# Patient Record
Sex: MALE | Race: WHITE | NOT HISPANIC OR LATINO | Employment: UNEMPLOYED | URBAN - NONMETROPOLITAN AREA
[De-identification: names, ages, dates, MRNs, and addresses within clinical notes are randomized per-mention and may not be internally consistent; named-entity substitution may affect disease eponyms.]

---

## 2020-05-31 LAB — SARS-COV-2 RNA RESP QL NAA+PROBE: NOT DETECTED

## 2020-05-31 PROCEDURE — 87635 SARS-COV-2 COVID-19 AMP PRB: CPT | Performed by: INTERNAL MEDICINE

## 2020-06-03 ENCOUNTER — ANESTHESIA EVENT (OUTPATIENT)
Dept: GASTROENTEROLOGY | Facility: HOSPITAL | Age: 45
End: 2020-06-03

## 2020-06-03 ENCOUNTER — HOSPITAL ENCOUNTER (OUTPATIENT)
Facility: HOSPITAL | Age: 45
Setting detail: HOSPITAL OUTPATIENT SURGERY
Discharge: HOME | End: 2020-06-03
Attending: INTERNAL MEDICINE | Admitting: INTERNAL MEDICINE

## 2020-06-03 ENCOUNTER — ANESTHESIA (OUTPATIENT)
Dept: GASTROENTEROLOGY | Facility: HOSPITAL | Age: 45
End: 2020-06-03

## 2020-06-03 VITALS
RESPIRATION RATE: 20 BRPM | HEART RATE: 85 BPM | OXYGEN SATURATION: 96 % | HEIGHT: 71 IN | TEMPERATURE: 97.8 F | WEIGHT: 277 LBS | SYSTOLIC BLOOD PRESSURE: 103 MMHG | BODY MASS INDEX: 38.78 KG/M2 | DIASTOLIC BLOOD PRESSURE: 64 MMHG

## 2020-06-03 DIAGNOSIS — R10.12 LEFT UPPER QUADRANT PAIN: ICD-10-CM

## 2020-06-03 PROCEDURE — 87076 CULTURE ANAEROBE IDENT EACH: CPT | Performed by: INTERNAL MEDICINE

## 2020-06-03 PROCEDURE — 25010000002 PROPOFOL 10 MG/ML EMULSION: Performed by: NURSE ANESTHETIST, CERTIFIED REGISTERED

## 2020-06-03 PROCEDURE — 87071 CULTURE AEROBIC QUANT OTHER: CPT | Performed by: INTERNAL MEDICINE

## 2020-06-03 RX ORDER — ONDANSETRON 2 MG/ML
4 INJECTION INTRAMUSCULAR; INTRAVENOUS ONCE AS NEEDED
Status: DISCONTINUED | OUTPATIENT
Start: 2020-06-03 | End: 2020-06-03 | Stop reason: HOSPADM

## 2020-06-03 RX ORDER — LISINOPRIL 10 MG/1
10 TABLET ORAL DAILY
COMMUNITY

## 2020-06-03 RX ORDER — LIDOCAINE HYDROCHLORIDE 20 MG/ML
INJECTION, SOLUTION INTRAVENOUS AS NEEDED
Status: DISCONTINUED | OUTPATIENT
Start: 2020-06-03 | End: 2020-06-03 | Stop reason: SURG

## 2020-06-03 RX ORDER — HYDROCODONE BITARTRATE AND ACETAMINOPHEN 10; 325 MG/1; MG/1
1 TABLET ORAL 4 TIMES DAILY
COMMUNITY

## 2020-06-03 RX ORDER — TADALAFIL 5 MG/1
5 TABLET ORAL DAILY PRN
COMMUNITY

## 2020-06-03 RX ORDER — MELOXICAM 15 MG/1
15 TABLET ORAL DAILY
COMMUNITY

## 2020-06-03 RX ORDER — DEXTROSE AND SODIUM CHLORIDE 5; .45 G/100ML; G/100ML
30 INJECTION, SOLUTION INTRAVENOUS CONTINUOUS PRN
Status: DISCONTINUED | OUTPATIENT
Start: 2020-06-03 | End: 2020-06-03 | Stop reason: HOSPADM

## 2020-06-03 RX ORDER — MORPHINE SULFATE 15 MG/1
15 TABLET ORAL 2 TIMES DAILY
COMMUNITY
End: 2022-05-02 | Stop reason: SDUPTHER

## 2020-06-03 RX ORDER — TIZANIDINE 4 MG/1
4 TABLET ORAL 4 TIMES DAILY
COMMUNITY

## 2020-06-03 RX ORDER — PROPOFOL 10 MG/ML
VIAL (ML) INTRAVENOUS AS NEEDED
Status: DISCONTINUED | OUTPATIENT
Start: 2020-06-03 | End: 2020-06-03 | Stop reason: SURG

## 2020-06-03 RX ADMIN — PROPOFOL 20 MG: 10 INJECTION, EMULSION INTRAVENOUS at 16:16

## 2020-06-03 RX ADMIN — PROPOFOL 20 MG: 10 INJECTION, EMULSION INTRAVENOUS at 16:21

## 2020-06-03 RX ADMIN — PROPOFOL 20 MG: 10 INJECTION, EMULSION INTRAVENOUS at 16:07

## 2020-06-03 RX ADMIN — PROPOFOL 20 MG: 10 INJECTION, EMULSION INTRAVENOUS at 16:14

## 2020-06-03 RX ADMIN — PROPOFOL 20 MG: 10 INJECTION, EMULSION INTRAVENOUS at 16:22

## 2020-06-03 RX ADMIN — PROPOFOL 50 MG: 10 INJECTION, EMULSION INTRAVENOUS at 16:05

## 2020-06-03 RX ADMIN — DEXTROSE AND SODIUM CHLORIDE 30 ML/HR: 5; 450 INJECTION, SOLUTION INTRAVENOUS at 16:00

## 2020-06-03 RX ADMIN — PROPOFOL 100 MG: 10 INJECTION, EMULSION INTRAVENOUS at 16:03

## 2020-06-03 RX ADMIN — PROPOFOL 20 MG: 10 INJECTION, EMULSION INTRAVENOUS at 16:13

## 2020-06-03 RX ADMIN — LIDOCAINE HYDROCHLORIDE 60 MG: 20 INJECTION, SOLUTION INTRAVENOUS at 16:03

## 2020-06-03 RX ADMIN — PROPOFOL 20 MG: 10 INJECTION, EMULSION INTRAVENOUS at 16:08

## 2020-06-03 RX ADMIN — PROPOFOL 20 MG: 10 INJECTION, EMULSION INTRAVENOUS at 16:09

## 2020-06-03 RX ADMIN — PROPOFOL 50 MG: 10 INJECTION, EMULSION INTRAVENOUS at 16:04

## 2020-06-03 RX ADMIN — PROPOFOL 20 MG: 10 INJECTION, EMULSION INTRAVENOUS at 16:10

## 2020-06-03 RX ADMIN — PROPOFOL 20 MG: 10 INJECTION, EMULSION INTRAVENOUS at 16:12

## 2020-06-03 RX ADMIN — PROPOFOL 20 MG: 10 INJECTION, EMULSION INTRAVENOUS at 16:11

## 2020-06-03 RX ADMIN — PROPOFOL 20 MG: 10 INJECTION, EMULSION INTRAVENOUS at 16:18

## 2020-06-03 RX ADMIN — PROPOFOL 20 MG: 10 INJECTION, EMULSION INTRAVENOUS at 16:17

## 2020-06-03 RX ADMIN — PROPOFOL 20 MG: 10 INJECTION, EMULSION INTRAVENOUS at 16:15

## 2020-06-03 RX ADMIN — PROPOFOL 20 MG: 10 INJECTION, EMULSION INTRAVENOUS at 16:19

## 2020-06-03 RX ADMIN — PROPOFOL 20 MG: 10 INJECTION, EMULSION INTRAVENOUS at 16:20

## 2020-06-03 NOTE — ANESTHESIA PREPROCEDURE EVALUATION
Anesthesia Evaluation     Patient summary reviewed   NPO Solid Status: > 8 hours  NPO Liquid Status: > 2 hours           Airway   Mallampati: II  TM distance: >3 FB  Dental      Pulmonary - normal exam   (+) a smoker Current Smoked day of surgery,   Cardiovascular - negative cardio ROS and normal exam        Neuro/Psych  GI/Hepatic/Renal/Endo    (+) obesity, morbid obesity,      Musculoskeletal     (+) chronic pain,   Abdominal    Substance History      OB/GYN          Other                      Anesthesia Plan    ASA 3     MAC     intravenous induction     Anesthetic plan, all risks, benefits, and alternatives have been provided, discussed and informed consent has been obtained with: patient.

## 2020-06-03 NOTE — ANESTHESIA POSTPROCEDURE EVALUATION
Patient: Andres Murphy    Procedure Summary     Date:  06/03/20 Room / Location:  [facility] ENDOSCOPY 2 / [facility] ENDOSCOPY    Anesthesia Start:  1602 Anesthesia Stop:  1629    Procedures:       ESOPHAGOGASTRODUODENOSCOPY (N/A )      COLONOSCOPY (N/A ) Diagnosis:       Left upper quadrant pain      Gastritis      Esophagitis      Colon polyp      (Left upper quadrant pain [R10.12])    Surgeon:  Temo Andrade DO Provider:  Jamey Orozco CRNA    Anesthesia Type:  MAC ASA Status:  3          Anesthesia Type: MAC    Vitals  No vitals data found for the desired time range.          Post Anesthesia Care and Evaluation    Patient location during evaluation: PACU  Level of consciousness: awake  Pain score: 0  Pain management: adequate  Airway patency: patent  Anesthetic complications: No anesthetic complications  PONV Status: none  Cardiovascular status: acceptable and hemodynamically stable  Respiratory status: acceptable and spontaneous ventilation  Hydration status: acceptable

## 2020-06-03 NOTE — H&P
Ra Hedrick DO,[facility]  Gastroenterology  Hepatology  Endoscopy  Board Certified in Internal Medicine and gastroenterology  [address], [address]  [place]. [address]  - [phone]   F - [phone]     GASTROENTEROLOGY HISTORY AND PHYSICAL  NOTE   RA HEDRICK DO.         SUBJECTIVE:   6/3/2020    Name: Andres Murphy  DOD: 1975    Chief Complaint:       Subjective : Left upper quadrant pain, left-sided abdominal pain.  Multiple evaluations to include over 6 CT scans of done over the past 4 years.  Only one has shown diverticular disease.  Multiple medical treatments have been done without improvement.    Patient is 45 y.o. male presents with desire for elective colonoscopy for the questionable abnormal CT scan on one occasion.      ROS/HISTORY/ CURRENT MEDICATIONS/OBJECTIVE/VS/PE:   Review of Systems:  All systems unremarkable unless specified below.  Constitutional   HENT  Eyes   Respiratory    Cardiovascular  Gastrointestinal   Endocrine  Genitourinary    Musculoskeletal   Skin  Allergic/Immunologic    Neurological    Hematological  Psychiatric/Behavioral    History:     Past Medical History:   Diagnosis Date   • Encounter for routine adult health examination      Past Surgical History:   Procedure Laterality Date   • CARPAL TUNNEL RELEASE     • VASECTOMY       Family History   Problem Relation Age of Onset   • Cancer Other    • Colon cancer Other    • Depression Other    • Diabetes Other    • Osteoarthritis Other    • Stroke Other    • Thyroid disease Other      Social History     Tobacco Use   • Smoking status: Current Every Day Smoker   Substance Use Topics   • Alcohol use: No   • Drug use: Not on file     Prior to Admission medications    Medication Sig Start Date End Date Taking? Authorizing Provider   baclofen (LIORESAL) 20 MG tablet Take 20 mg by mouth 3 (Three) Times a Day.    Provider, MD Dylna   HYDROcodone-acetaminophen (NORCO) 7.5-325 MG per tablet Take 1  "tablet by mouth Every 6 (Six) Hours As Needed for Moderate Pain (4-6).    Provider, MD Dylan   Morphine (AVINza) 30 MG 24 hr capsule Take 30 mg by mouth Daily.    Provider, Dylan, MD     Allergies:  Patient has no known allergies.    I have reviewed the patients medical history, surgical history and family history in the available medical record system.     Current Medications:     No current facility-administered medications for this encounter.      Current Outpatient Medications   Medication Sig Dispense Refill   • baclofen (LIORESAL) 20 MG tablet Take 20 mg by mouth 3 (Three) Times a Day.     • HYDROcodone-acetaminophen (NORCO) 7.5-325 MG per tablet Take 1 tablet by mouth Every 6 (Six) Hours As Needed for Moderate Pain (4-6).     • Morphine (AVINza) 30 MG 24 hr capsule Take 30 mg by mouth Daily.         Objective     Physical Exam:   BP: ()/()   Arterial Line BP: ()/()   /64   Pulse 85   Temp 97.8 °F (36.6 °C)   Resp 20   Ht 180.3 cm (71\")   Wt 126 kg (277 lb)   SpO2 96%   BMI 38.63 kg/m²   Physical Exam:  General Appearance:    Alert, cooperative, in no acute distress   Head:    Normocephalic, without obvious abnormality, atraumatic   Eyes:            Lids and lashes normal, conjunctivae and sclerae normal, no   icterus, no pallor, corneas clear, PERRLA   Ears:    Ears appear intact with no abnormalities noted   Throat:   No oral lesions, no thrush, oral mucosa moist   Neck:   No adenopathy, supple, trachea midline, no thyromegaly, no     carotid bruit, no JVD   Back:     No kyphosis present, no scoliosis present, no skin lesions,       erythema or scars, no tenderness to percussion or                   palpation,   range of motion normal   Lungs:     Clear to auscultation,respirations regular, even and                   unlabored    Heart:    Regular rhythm and normal rate, normal S1 and S2, no            murmur, no gallop, no rub, no click   Breast Exam:    Deferred   Abdomen:     " Normal bowel sounds, no masses, no organomegaly, soft        non-tender, non-distended, no guarding, no rebound                 tenderness   Genitalia:    Deferred   Extremities:   Moves all extremities well, no edema, no cyanosis, no              redness   Pulses:   Pulses palpable and equal bilaterally   Skin:   No bleeding, bruising or rash   Lymph nodes:   No palpable adenopathy   Neurologic:   Cranial nerves 2 - 12 grossly intact, sensation intact, DTR        present and equal bilaterally      Results Review:     No results found for: WBC, HGB, HCT, PLT          No results found for: LIPASE  No results found for: INR  No results found for: CULTURE    Radiology Review:  Imaging Results (Last 72 Hours)     ** No results found for the last 72 hours. **           I reviewed the patient's new clinical results.  I reviewed the patient's new imaging results and agree with the interpretation.     ASSESSMENT/PLAN:   ASSESSMENT:  1.  Diverticulosis noted on one occasion  2.  Abdominal pain, not improving with medical treatments    PLAN:  1.  Colonoscopy for further evaluation    Risk and benefits associated with the procedure are reviewed with the patient.  The patient wished to proceed       Temo Andrade DO  06/03/20  13:16

## 2020-06-05 LAB
BACTERIA SPEC AEROBE CULT: ABNORMAL
LAB AP CASE REPORT: NORMAL
PATH REPORT.FINAL DX SPEC: NORMAL

## 2020-09-27 ENCOUNTER — HOSPITAL ENCOUNTER (INPATIENT)
Facility: HOSPITAL | Age: 45
LOS: 1 days | Discharge: HOME | End: 2020-09-27
Attending: FAMILY MEDICINE | Admitting: PSYCHIATRY & NEUROLOGY

## 2020-09-27 ENCOUNTER — APPOINTMENT (OUTPATIENT)
Dept: ULTRASOUND IMAGING | Facility: HOSPITAL | Age: 45
End: 2020-09-27

## 2020-09-27 VITALS
RESPIRATION RATE: 16 BRPM | WEIGHT: 282.6 LBS | DIASTOLIC BLOOD PRESSURE: 60 MMHG | BODY MASS INDEX: 39.56 KG/M2 | TEMPERATURE: 98.1 F | OXYGEN SATURATION: 95 % | HEIGHT: 71 IN | SYSTOLIC BLOOD PRESSURE: 139 MMHG | HEART RATE: 70 BPM

## 2020-09-27 DIAGNOSIS — G35 MULTIPLE SCLEROSIS EXACERBATION (HCC): Primary | ICD-10-CM

## 2020-09-27 PROBLEM — R20.2 PARESTHESIAS: Status: ACTIVE | Noted: 2020-09-27

## 2020-09-27 LAB
ALBUMIN SERPL-MCNC: 4.6 G/DL (ref 3.5–5.2)
ALBUMIN/GLOB SERPL: 2.3 G/DL
ALP SERPL-CCNC: 65 U/L (ref 39–117)
ALT SERPL W P-5'-P-CCNC: 13 U/L (ref 1–41)
ANION GAP SERPL CALCULATED.3IONS-SCNC: 13 MMOL/L (ref 5–15)
APTT PPP: 24.5 SECONDS (ref 24.1–35)
AST SERPL-CCNC: 15 U/L (ref 1–40)
BASOPHILS # BLD AUTO: 0.04 10*3/MM3 (ref 0–0.2)
BASOPHILS NFR BLD AUTO: 0.5 % (ref 0–1.5)
BILIRUB SERPL-MCNC: 0.2 MG/DL (ref 0–1.2)
BUN SERPL-MCNC: 15 MG/DL (ref 6–20)
BUN/CREAT SERPL: 17.6 (ref 7–25)
CALCIUM SPEC-SCNC: 9.3 MG/DL (ref 8.6–10.5)
CHLORIDE SERPL-SCNC: 105 MMOL/L (ref 98–107)
CO2 SERPL-SCNC: 23 MMOL/L (ref 22–29)
CREAT SERPL-MCNC: 0.85 MG/DL (ref 0.76–1.27)
DEPRECATED RDW RBC AUTO: 45.2 FL (ref 37–54)
EOSINOPHIL # BLD AUTO: 0.2 10*3/MM3 (ref 0–0.4)
EOSINOPHIL NFR BLD AUTO: 2.6 % (ref 0.3–6.2)
ERYTHROCYTE [DISTWIDTH] IN BLOOD BY AUTOMATED COUNT: 13.4 % (ref 12.3–15.4)
GFR SERPL CREATININE-BSD FRML MDRD: 97 ML/MIN/1.73
GLOBULIN UR ELPH-MCNC: 2 GM/DL
GLUCOSE SERPL-MCNC: 127 MG/DL (ref 65–99)
HCT VFR BLD AUTO: 36.3 % (ref 37.5–51)
HGB BLD-MCNC: 12.2 G/DL (ref 13–17.7)
IMM GRANULOCYTES # BLD AUTO: 0.05 10*3/MM3 (ref 0–0.05)
IMM GRANULOCYTES NFR BLD AUTO: 0.7 % (ref 0–0.5)
INR PPP: 1.1 (ref 0.91–1.09)
LYMPHOCYTES # BLD AUTO: 1.31 10*3/MM3 (ref 0.7–3.1)
LYMPHOCYTES NFR BLD AUTO: 17.1 % (ref 19.6–45.3)
MCH RBC QN AUTO: 31.2 PG (ref 26.6–33)
MCHC RBC AUTO-ENTMCNC: 33.6 G/DL (ref 31.5–35.7)
MCV RBC AUTO: 92.8 FL (ref 79–97)
MONOCYTES # BLD AUTO: 0.65 10*3/MM3 (ref 0.1–0.9)
MONOCYTES NFR BLD AUTO: 8.5 % (ref 5–12)
NEUTROPHILS NFR BLD AUTO: 5.4 10*3/MM3 (ref 1.7–7)
NEUTROPHILS NFR BLD AUTO: 70.6 % (ref 42.7–76)
NRBC BLD AUTO-RTO: 0 /100 WBC (ref 0–0.2)
PLATELET # BLD AUTO: 227 10*3/MM3 (ref 140–450)
PMV BLD AUTO: 9.4 FL (ref 6–12)
POTASSIUM SERPL-SCNC: 4.1 MMOL/L (ref 3.5–5.2)
PROT SERPL-MCNC: 6.6 G/DL (ref 6–8.5)
PROTHROMBIN TIME: 13.8 SECONDS (ref 11.9–14.6)
RBC # BLD AUTO: 3.91 10*6/MM3 (ref 4.14–5.8)
SARS-COV-2 RNA PNL SPEC NAA+PROBE: NOT DETECTED
SODIUM SERPL-SCNC: 141 MMOL/L (ref 136–145)
WBC # BLD AUTO: 7.65 10*3/MM3 (ref 3.4–10.8)

## 2020-09-27 PROCEDURE — 25010000002 METHYLPREDNISOLONE PER 125 MG: Performed by: FAMILY MEDICINE

## 2020-09-27 PROCEDURE — 99236 HOSP IP/OBS SAME DATE HI 85: CPT | Performed by: PSYCHIATRY & NEUROLOGY

## 2020-09-27 PROCEDURE — 80053 COMPREHEN METABOLIC PANEL: CPT | Performed by: FAMILY MEDICINE

## 2020-09-27 PROCEDURE — 93971 EXTREMITY STUDY: CPT

## 2020-09-27 PROCEDURE — 93971 EXTREMITY STUDY: CPT | Performed by: SURGERY

## 2020-09-27 PROCEDURE — G0378 HOSPITAL OBSERVATION PER HR: HCPCS

## 2020-09-27 PROCEDURE — 99283 EMERGENCY DEPT VISIT LOW MDM: CPT

## 2020-09-27 PROCEDURE — 85025 COMPLETE CBC W/AUTO DIFF WBC: CPT | Performed by: FAMILY MEDICINE

## 2020-09-27 PROCEDURE — 85610 PROTHROMBIN TIME: CPT | Performed by: FAMILY MEDICINE

## 2020-09-27 PROCEDURE — 85730 THROMBOPLASTIN TIME PARTIAL: CPT | Performed by: FAMILY MEDICINE

## 2020-09-27 PROCEDURE — 87635 SARS-COV-2 COVID-19 AMP PRB: CPT | Performed by: FAMILY MEDICINE

## 2020-09-27 RX ORDER — ACETAMINOPHEN 325 MG/1
650 TABLET ORAL EVERY 6 HOURS PRN
Status: DISCONTINUED | OUTPATIENT
Start: 2020-09-27 | End: 2020-09-28 | Stop reason: HOSPADM

## 2020-09-27 RX ORDER — HYDROXYZINE HYDROCHLORIDE 25 MG/1
25 TABLET, FILM COATED ORAL 2 TIMES DAILY
COMMUNITY

## 2020-09-27 RX ORDER — DIPHENHYDRAMINE HCL 25 MG
25 CAPSULE ORAL NIGHTLY PRN
COMMUNITY

## 2020-09-27 RX ORDER — CHOLECALCIFEROL (VITAMIN D3) 125 MCG
20 CAPSULE ORAL
Status: ON HOLD | COMMUNITY
End: 2020-09-27

## 2020-09-27 RX ORDER — PHENOL 1.4 %
20 AEROSOL, SPRAY (ML) MUCOUS MEMBRANE NIGHTLY PRN
COMMUNITY

## 2020-09-27 RX ORDER — PANTOPRAZOLE SODIUM 40 MG/1
40 TABLET, DELAYED RELEASE ORAL DAILY
COMMUNITY

## 2020-09-27 RX ORDER — METHYLPREDNISOLONE SODIUM SUCCINATE 125 MG/2ML
125 INJECTION, POWDER, LYOPHILIZED, FOR SOLUTION INTRAMUSCULAR; INTRAVENOUS ONCE
Status: COMPLETED | OUTPATIENT
Start: 2020-09-27 | End: 2020-09-27

## 2020-09-27 RX ADMIN — METHYLPREDNISOLONE SODIUM SUCCINATE 125 MG: 125 INJECTION, POWDER, FOR SOLUTION INTRAMUSCULAR; INTRAVENOUS at 15:41

## 2020-09-28 ENCOUNTER — TELEPHONE (OUTPATIENT)
Dept: NEUROLOGY | Facility: CLINIC | Age: 45
End: 2020-09-28

## 2020-09-28 NOTE — TELEPHONE ENCOUNTER
Hospital called today for a follow in 3 wks for this patient specifically with Jasvir, but I did not have any openings until 1/8/2021. I scheduled it then took a copy to Branden so she could find an earlier time because patient has MS and I did not think it should wait.

## 2020-10-07 ENCOUNTER — TELEPHONE (OUTPATIENT)
Dept: NEUROLOGY | Facility: CLINIC | Age: 45
End: 2020-10-07

## 2020-10-07 NOTE — TELEPHONE ENCOUNTER
"PT SEEN DOCTOR KAYCEE ON 9- ANS IS SCHEDULE FOR A HOS F/U WITH MARII FOSSES ON 10-, PER 'S NOTES \"He may be a candidate for Acthar and I discussed this with him.  I once again encouraged him to establish care in an MS clinic but he refuses to do this.  I have offered him a visit in our outpatient neurology clinic.  If he does this I would require that he would have an open mind and considering a disease modifying agent as we would have no desire to only treat him with acute agent such as Acthar.  I do not believe that he would require any further steroids as the risk of these would far outweigh any benefit given his level of osteopenia and avascular necrosis of the hip.  I think Ocrevus or Lemtrada would be at least 2 medications that I would consider in him and he still would likely require referral to an MS center for further clarification\"    PATIENT STATES [employer] DENIED THIS AND STATES [facility] DENIED. HE IS WONDERING WHAT THE NEXT STEP WOULD BE.    PLEASE ADVISE.     Andres Murphy ([facility]) [phone] ()       "

## 2020-10-08 NOTE — TELEPHONE ENCOUNTER
I called and spoke with Mr Murphy to let him know that I received the denial and that I have faxed in an Appeal. I did tell him that I will let him know as soon as I hear anything. He did voice understanding.

## 2020-10-19 NOTE — TELEPHONE ENCOUNTER
Patient called and stated that he got a call from [facility] yesterday and they need to speak to someone to get this appeal approved. They told patient that if we call and tell them it is urgent and that he has tried Methylprednisone and he cannot use it due to his issues.     Can someone please contact [facility] at [phone] and let them know that this is urgent so patient can get his medication?       If any questions please contact patient.

## 2020-10-26 ENCOUNTER — OFFICE VISIT (OUTPATIENT)
Dept: NEUROLOGY | Facility: CLINIC | Age: 45
End: 2020-10-26

## 2020-10-26 VITALS
HEIGHT: 71 IN | WEIGHT: 282 LBS | DIASTOLIC BLOOD PRESSURE: 80 MMHG | OXYGEN SATURATION: 98 % | BODY MASS INDEX: 39.48 KG/M2 | HEART RATE: 76 BPM | SYSTOLIC BLOOD PRESSURE: 130 MMHG

## 2020-10-26 DIAGNOSIS — G35 MS (MULTIPLE SCLEROSIS) (HCC): Primary | ICD-10-CM

## 2020-10-26 DIAGNOSIS — M87.051 AVASCULAR NECROSIS OF BONE OF RIGHT HIP (HCC): ICD-10-CM

## 2020-10-26 DIAGNOSIS — G82.50 QUADRIPARESIS (HCC): ICD-10-CM

## 2020-10-26 DIAGNOSIS — R20.2 PARESTHESIAS: ICD-10-CM

## 2020-10-26 PROCEDURE — 99215 OFFICE O/P EST HI 40 MIN: CPT | Performed by: PHYSICIAN ASSISTANT

## 2020-10-27 PROBLEM — G82.50 QUADRIPARESIS (HCC): Status: ACTIVE | Noted: 2020-10-27

## 2020-10-27 PROBLEM — M87.051 AVASCULAR NECROSIS OF BONE OF RIGHT HIP: Status: ACTIVE | Noted: 2020-10-27

## 2020-11-20 ENCOUNTER — TELEPHONE (OUTPATIENT)
Dept: NEUROLOGY | Facility: CLINIC | Age: 45
End: 2020-11-20

## 2020-11-20 NOTE — TELEPHONE ENCOUNTER
PATIENT CALLED AND STATED THERE IS SOMETHING WRONG WITH HIS PRESCRIPTION IN REGARD TO ACTHAR.      HE ASKED TO SPEAK TO BRIAN.    PLEASE ADVISE.    PH: [phone]

## 2020-11-20 NOTE — TELEPHONE ENCOUNTER
Script called to [facility]. Message left for Andres requesting a return call.

## 2020-11-20 NOTE — TELEPHONE ENCOUNTER
----- Message from TAMEKA Alvarez sent at 11/20/2020 12:36 PM CST -----  80 units daily x 10 days = two 5 ml vials, 80 units per ml      ----- Message -----  From: Loulou Hilton LPN  Sent: 11/20/2020  10:27 AM CST  To: TAMEKA Alvarez    Andres said [facility] told him they need to know how many vials of Acthar he needs.  What directions do you want?  I will call it in for him.

## 2020-11-23 RX ORDER — REPOSITORY CORTICOTROPIN 80 [USP'U]/ML
INJECTION INTRAMUSCULAR; SUBCUTANEOUS
OUTPATIENT
Start: 2020-11-23

## 2020-11-23 NOTE — TELEPHONE ENCOUNTER
[facility] said Acthar was shipped on Friday.  Notified Jasvir wants him to take it for 10 days, he doesn't need a refill at this time.

## 2020-11-30 ENCOUNTER — LAB (OUTPATIENT)
Dept: LAB | Facility: HOSPITAL | Age: 45
End: 2020-11-30

## 2020-11-30 ENCOUNTER — OFFICE VISIT (OUTPATIENT)
Dept: NEUROLOGY | Facility: CLINIC | Age: 45
End: 2020-11-30

## 2020-11-30 VITALS
WEIGHT: 282 LBS | HEART RATE: 86 BPM | DIASTOLIC BLOOD PRESSURE: 78 MMHG | SYSTOLIC BLOOD PRESSURE: 140 MMHG | HEIGHT: 71 IN | BODY MASS INDEX: 39.48 KG/M2 | OXYGEN SATURATION: 98 %

## 2020-11-30 DIAGNOSIS — G82.50 QUADRIPARESIS (HCC): ICD-10-CM

## 2020-11-30 DIAGNOSIS — R20.2 PARESTHESIAS: ICD-10-CM

## 2020-11-30 DIAGNOSIS — G35 MULTIPLE SCLEROSIS (HCC): Primary | ICD-10-CM

## 2020-11-30 DIAGNOSIS — G35 MS (MULTIPLE SCLEROSIS) (HCC): Primary | ICD-10-CM

## 2020-11-30 LAB
ALBUMIN SERPL-MCNC: 4.3 G/DL (ref 3.5–5.2)
ALBUMIN/GLOB SERPL: 1.9 G/DL
ALP SERPL-CCNC: 71 U/L (ref 39–117)
ALT SERPL W P-5'-P-CCNC: 18 U/L (ref 1–41)
ANION GAP SERPL CALCULATED.3IONS-SCNC: 7 MMOL/L (ref 5–15)
AST SERPL-CCNC: 14 U/L (ref 1–40)
BASOPHILS # BLD AUTO: 0.05 10*3/MM3 (ref 0–0.2)
BASOPHILS NFR BLD AUTO: 0.4 % (ref 0–1.5)
BILIRUB SERPL-MCNC: 0.2 MG/DL (ref 0–1.2)
BUN SERPL-MCNC: 22 MG/DL (ref 6–20)
BUN/CREAT SERPL: 28.6 (ref 7–25)
CALCIUM SPEC-SCNC: 9.2 MG/DL (ref 8.6–10.5)
CHLORIDE SERPL-SCNC: 107 MMOL/L (ref 98–107)
CO2 SERPL-SCNC: 29 MMOL/L (ref 22–29)
CREAT SERPL-MCNC: 0.77 MG/DL (ref 0.76–1.27)
DEPRECATED RDW RBC AUTO: 45.6 FL (ref 37–54)
EOSINOPHIL # BLD AUTO: 0 10*3/MM3 (ref 0–0.4)
EOSINOPHIL NFR BLD AUTO: 0 % (ref 0.3–6.2)
ERYTHROCYTE [DISTWIDTH] IN BLOOD BY AUTOMATED COUNT: 13.6 % (ref 12.3–15.4)
GFR SERPL CREATININE-BSD FRML MDRD: 109 ML/MIN/1.73
GLOBULIN UR ELPH-MCNC: 2.3 GM/DL
GLUCOSE SERPL-MCNC: 125 MG/DL (ref 65–99)
HCT VFR BLD AUTO: 36 % (ref 37.5–51)
HGB BLD-MCNC: 12 G/DL (ref 13–17.7)
IMM GRANULOCYTES # BLD AUTO: 0.26 10*3/MM3 (ref 0–0.05)
IMM GRANULOCYTES NFR BLD AUTO: 2 % (ref 0–0.5)
LYMPHOCYTES # BLD AUTO: 0.99 10*3/MM3 (ref 0.7–3.1)
LYMPHOCYTES NFR BLD AUTO: 7.6 % (ref 19.6–45.3)
MCH RBC QN AUTO: 31 PG (ref 26.6–33)
MCHC RBC AUTO-ENTMCNC: 33.3 G/DL (ref 31.5–35.7)
MCV RBC AUTO: 93 FL (ref 79–97)
MONOCYTES # BLD AUTO: 0.72 10*3/MM3 (ref 0.1–0.9)
MONOCYTES NFR BLD AUTO: 5.5 % (ref 5–12)
NEUTROPHILS NFR BLD AUTO: 11.02 10*3/MM3 (ref 1.7–7)
NEUTROPHILS NFR BLD AUTO: 84.5 % (ref 42.7–76)
NRBC BLD AUTO-RTO: 0 /100 WBC (ref 0–0.2)
PLATELET # BLD AUTO: 235 10*3/MM3 (ref 140–450)
PMV BLD AUTO: 9.7 FL (ref 6–12)
POTASSIUM SERPL-SCNC: 4 MMOL/L (ref 3.5–5.2)
PROT SERPL-MCNC: 6.6 G/DL (ref 6–8.5)
RBC # BLD AUTO: 3.87 10*6/MM3 (ref 4.14–5.8)
SODIUM SERPL-SCNC: 143 MMOL/L (ref 136–145)
WBC # BLD AUTO: 13.04 10*3/MM3 (ref 3.4–10.8)

## 2020-11-30 PROCEDURE — 36415 COLL VENOUS BLD VENIPUNCTURE: CPT

## 2020-11-30 PROCEDURE — 80053 COMPREHEN METABOLIC PANEL: CPT | Performed by: PHYSICIAN ASSISTANT

## 2020-11-30 PROCEDURE — 99214 OFFICE O/P EST MOD 30 MIN: CPT | Performed by: PHYSICIAN ASSISTANT

## 2020-11-30 PROCEDURE — 85025 COMPLETE CBC W/AUTO DIFF WBC: CPT | Performed by: PHYSICIAN ASSISTANT

## 2020-11-30 RX ORDER — REPOSITORY CORTICOTROPIN 80 [USP'U]/ML
80 INJECTION INTRAMUSCULAR; SUBCUTANEOUS TAKE AS DIRECTED
Qty: 10 ML | Refills: 0
Start: 2020-11-30 | End: 2020-12-01 | Stop reason: SDUPTHER

## 2020-11-30 RX ORDER — REPOSITORY CORTICOTROPIN 80 [USP'U]/ML
80 INJECTION INTRAMUSCULAR; SUBCUTANEOUS TAKE AS DIRECTED
COMMUNITY
Start: 2020-11-20 | End: 2020-11-30 | Stop reason: SDUPTHER

## 2020-12-01 RX ORDER — REPOSITORY CORTICOTROPIN 80 [USP'U]/ML
80 INJECTION INTRAMUSCULAR; SUBCUTANEOUS TAKE AS DIRECTED
Qty: 10 ML | Refills: 0
Start: 2020-12-01 | End: 2020-12-14 | Stop reason: SDUPTHER

## 2020-12-03 ENCOUNTER — TELEPHONE (OUTPATIENT)
Dept: NEUROLOGY | Facility: CLINIC | Age: 45
End: 2020-12-03

## 2020-12-03 NOTE — TELEPHONE ENCOUNTER
Parveen notified Jasvir wants him to take the last 2 doses of Acthar, he suggested he contact his PCP for an evaluation about the fluid retention.

## 2020-12-03 NOTE — TELEPHONE ENCOUNTER
----- Message from TAMEKA Alvarez sent at 12/3/2020 12:34 PM CST -----  Take Acthar     ----- Message -----  From: Loulou Hilton LPN  Sent: 12/3/2020  12:31 PM CST  To: TAMEKA Alvarez    Parveen said he has had 8 doses of Acthar.  The left side of his face is swollen, he feels like he is retaining fluid, and has some swelling around his ankles.  He has notified a little improvement in his right arm, no improvement in his leg.  He didn't take Acthar today.  He wants to know what he should do.

## 2020-12-03 NOTE — TELEPHONE ENCOUNTER
DELETE AFTER REVIEWING: Telephone encounter to be sent to the clinical pool     Caller: [place]     Relationship: SELF    Best call back number: [phone]    What medications are you currently taking:   Current Outpatient Medications on File Prior to Visit   Medication Sig Dispense Refill   • Acthar 80 UNIT/ML injectable gel Inject 1 mL into the appropriate muscle as directed by prescriber Take As Directed. For 10 days 10 mL 0   • Ascorbic Acid (VITAMIN C PO) Take  by mouth Daily.     • baclofen (LIORESAL) 20 MG tablet Take 20 mg by mouth 4 (Four) Times a Day. Alternating with Zanaflex     • dicyclomine (BENTYL) 10 MG capsule Take 10 mg by mouth 4 (Four) Times a Day Before Meals & at Bedtime.     • diphenhydrAMINE (BENADRYL) 25 mg capsule Take 25 mg by mouth At Night As Needed for Allergies or Sleep.     • HYDROcodone-acetaminophen (NORCO)  MG per tablet Take 1 tablet by mouth 4 (Four) Times a Day.     • hydrOXYzine (ATARAX) 25 MG tablet Take 25 mg by mouth 2 (two) times a day.     • lisinopril (PRINIVIL,ZESTRIL) 10 MG tablet Take 10 mg by mouth Daily.     • Melatonin 10 MG tablet Take 20 mg by mouth Every Night. Takes 2 10mg tablets     • meloxicam (MOBIC) 15 MG tablet Take 15 mg by mouth Daily.     • Morphine (MSIR) 15 MG tablet Take 15 mg by mouth 2 (Two) Times a Day.     • pantoprazole (PROTONIX) 40 MG EC tablet Take 40 mg by mouth Daily.     • tadalafil (CIALIS) 5 MG tablet Take 5 mg by mouth Daily As Needed for Erectile Dysfunction.     • tiZANidine (ZANAFLEX) 4 MG tablet Take 4 mg by mouth 4 (Four) Times a Day. Alternating with baclofen.     • valACYclovir HCl (VALTREX PO) Take  by mouth Daily As Needed.     • VITAMIN D PO Take  by mouth Daily.       No current facility-administered medications on file prior to visit.         When did you start taking these medications: 8 DAYS AGO    Which medication are you concerned about: Acthar 80 UNIT/ML injectable gel       Who prescribed you this medication:  RUFINO COLVIN    What are your concerns: BLOATING IN FACE AND FEET  RETAINING A LOT OF WATER    How long have you been taking these medications: 8 DAYS    How long have you had these concerns: THREE DAYS AGO

## 2020-12-09 ENCOUNTER — TELEPHONE (OUTPATIENT)
Dept: NEUROLOGY | Facility: CLINIC | Age: 45
End: 2020-12-09

## 2020-12-09 NOTE — TELEPHONE ENCOUNTER
RICK FROM [facility] CALLED IN TODAY REGARDING PT'S PA THAT'S NEEDED FOR TECFIDERA. SHE STATES ACCORDING TO THE SPECIALTY PHARMACY, THEY RECEIVED A NOTIFICATION THAT A PA WAS NEEDED FOR THE MEDICATION AND A VERBAL FROM THE OFFICE CAN INITIATE THE PA. THEY CAN BE REACHED AT  [phone].      CALL BACK- [phone]

## 2020-12-11 LAB
CONV INDEX VALUE: 1.81
INTERPRETATION: ABNORMAL
INTERPRETATION: ABNORMAL
JCV ANTIBODY: POSITIVE

## 2020-12-14 DIAGNOSIS — G35 MS (MULTIPLE SCLEROSIS) (HCC): ICD-10-CM

## 2020-12-14 NOTE — TELEPHONE ENCOUNTER
PT CALLING TO REQUEST REFILL OF ACTHAR GEL FOR A 10 SUPPLY BE SENT TO [facility]. PT STATES THAT THE TECFIDERA IS STILL WAITING ON THE PRIOR AUTHORIZATION ACCORDING TO [facility].    PT CAN BE REACHED AT [phone].    PLEASE ADVISE.

## 2020-12-14 NOTE — TELEPHONE ENCOUNTER
CLARIBEL WITH [facility] CALLED DIRECTLY AFTER PT, FOLLOWING UP ON SAME ISSUE.    [facility] CAN BE REACHED AT [phone].

## 2020-12-15 RX ORDER — REPOSITORY CORTICOTROPIN 80 [USP'U]/ML
80 INJECTION INTRAMUSCULAR; SUBCUTANEOUS TAKE AS DIRECTED
Qty: 10 ML | Refills: 0 | Status: SHIPPED | OUTPATIENT
Start: 2020-12-15 | End: 2021-03-16

## 2020-12-18 NOTE — TELEPHONE ENCOUNTER
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 DADA Slaughter. If the Providers decide on this drug, a prior auth will be performed.

## 2020-12-22 NOTE — TELEPHONE ENCOUNTER
jose with [facility]/patients health insurance called and stated she spoke with the Specialty pharmacy in regards to tecfidera medication and they closed the pa because they stated they faxed over asking specific questions on why patient needed this medication and no one responded to them or answered the questions so they closed it, but [facility] was able to get it reopened and if someone calls expressing it is urgent the questions they need answered can be answered over the phone. Please state it is urgent per jose from [facility]  Specialty pharmacy for tecfidera   [phone]   PA number - [phone]

## 2020-12-29 ENCOUNTER — TELEPHONE (OUTPATIENT)
Dept: NEUROLOGY | Facility: CLINIC | Age: 45
End: 2020-12-29

## 2021-01-06 NOTE — PROGRESS NOTES
Subjective   Andres Murphy is a 45 y.o. male is here today for follow-up.    The reviewer is referred to clinical notes authored by Dr. Slaughter 27 September 2020.  These notes relate directly to today's visit which was offered to the patient to help establish a plan of care for ongoing untreated multiple sclerosis.  Partial summary of these records includes the report of diagnosis of multiple sclerosis in 2000 and treatment with the MS specialist, Dr. Shine in [place], [place].  It is noted that the patient was placed on several different DMT medications including interferons and at least the recommendations for Tysabri and more recently from another physician, Daxa.  There is mention in the history of a potential diagnosis of primary progressive multiple sclerosis.  The patient is and has been somewhat reticent to proceed with definitive treatment because of anxiety surrounding potential side effects or detrimental effects from therapy.  His disease has progressed essentially as untreated with only episodically been treated with steroids.  This episodic steroid treatment may have contributed to his current problems with avascular necrosis of the left hip.  The patient is here today to discuss the potential for establishing follow-up care as a regular patient.    The patient continues to have symptoms as described in his encounter with Dr. Slaughter.  Left-sided dominant sensory changes with increasing weakness.  The patient continues to have significant symptoms from his avascular necrosis and hip difficulties on the left side as well.  The patient expresses an interest in proceeding with Acthar as discussed previously.  The patient has substantial anxiety over starting disease modifying therapy and wishes to discuss this today.  The patient has a family history of leukemia and he wonders if this predisposes him to adverse events associated with disease modifying therapy for multiple sclerosis.  The  patient has multiple other questions and concerns regarding treating his multiple sclerosis.    We have included in this discussion a alvaro conversation regarding his apparent progressive disability associated with untreated multiple sclerosis.  Patient acknowledges that more than one neurologist has strongly recommended that he proceed with disease modifying therapy.    Multiple Sclerosis  This is a chronic problem. The current episode started more than 1 year ago. The problem occurs constantly. The problem has been rapidly worsening. Associated symptoms include abdominal pain, arthralgias, fatigue, headaches, neck pain, numbness, urinary symptoms, a visual change and weakness. The symptoms are aggravated by exertion, stress and walking. He has tried rest, position changes, oral narcotics, lying down and acetaminophen (History of interferon and DMT use, none since 2016) for the symptoms. The treatment provided mild relief.        The following portions of the patient's history were reviewed and updated as appropriate: allergies, current medications, past family history, past medical history, past social history, past surgical history and problem list.    Review of Systems   Constitutional: Positive for fatigue.   HENT: Negative for trouble swallowing.    Eyes: Positive for photophobia and visual disturbance.   Respiratory: Negative.    Cardiovascular: Negative.    Gastrointestinal: Positive for abdominal pain, diarrhea, GERD and indigestion.   Endocrine: Negative.    Genitourinary: Negative.    Musculoskeletal: Positive for arthralgias, back pain, gait problem and neck pain.   Skin: Negative.    Allergic/Immunologic: Negative.    Neurological: Positive for weakness, numbness and headache.   Hematological: Negative.    Psychiatric/Behavioral: Positive for dysphoric mood and sleep disturbance. The patient is nervous/anxious.          Current Outpatient Medications:   •  Ascorbic Acid (VITAMIN C PO), Take  by mouth  Daily., Disp: , Rfl:   •  baclofen (LIORESAL) 20 MG tablet, Take 20 mg by mouth 4 (Four) Times a Day. Alternating with Zanaflex, Disp: , Rfl:   •  dicyclomine (BENTYL) 10 MG capsule, Take 10 mg by mouth 4 (Four) Times a Day Before Meals & at Bedtime., Disp: , Rfl:   •  diphenhydrAMINE (BENADRYL) 25 mg capsule, Take 25 mg by mouth At Night As Needed for Allergies or Sleep., Disp: , Rfl:   •  HYDROcodone-acetaminophen (NORCO)  MG per tablet, Take 1 tablet by mouth 4 (Four) Times a Day., Disp: , Rfl:   •  hydrOXYzine (ATARAX) 25 MG tablet, Take 25 mg by mouth 2 (two) times a day., Disp: , Rfl:   •  lisinopril (PRINIVIL,ZESTRIL) 10 MG tablet, Take 10 mg by mouth Daily., Disp: , Rfl:   •  Melatonin 10 MG tablet, Take 20 mg by mouth Every Night. Takes 2 10mg tablets, Disp: , Rfl:   •  meloxicam (MOBIC) 15 MG tablet, Take 15 mg by mouth Daily., Disp: , Rfl:   •  Morphine (MSIR) 15 MG tablet, Take 15 mg by mouth 2 (Two) Times a Day., Disp: , Rfl:   •  pantoprazole (PROTONIX) 40 MG EC tablet, Take 40 mg by mouth Daily., Disp: , Rfl:   •  tadalafil (CIALIS) 5 MG tablet, Take 5 mg by mouth Daily As Needed for Erectile Dysfunction., Disp: , Rfl:   •  tiZANidine (ZANAFLEX) 4 MG tablet, Take 4 mg by mouth 4 (Four) Times a Day. Alternating with baclofen., Disp: , Rfl:   •  valACYclovir HCl (VALTREX PO), Take  by mouth Daily As Needed., Disp: , Rfl:   •  VITAMIN D PO, Take  by mouth Daily., Disp: , Rfl:   •  Acthar 80 UNIT/ML injectable gel, Inject 1 mL into the appropriate muscle as directed by prescriber Take As Directed. For 10 days, Disp: 10 mL, Rfl: 0     Objective   Physical Exam  Vitals signs and nursing note reviewed.   HENT:      Head: Normocephalic.      Right Ear: Hearing and external ear normal.      Left Ear: Hearing and external ear normal.      Nose: Nose normal.      Mouth/Throat:      Pharynx: Oropharynx is clear.   Eyes:      General: Lids are normal. Vision grossly intact. Gaze aligned appropriately. No  scleral icterus.     Extraocular Movements: Extraocular movements intact.      Conjunctiva/sclera: Conjunctivae normal.      Pupils: Pupils are equal, round, and reactive to light.      Visual Fields: Right eye visual fields normal and left eye visual fields normal.   Neck:      Musculoskeletal: Normal range of motion.      Vascular: No carotid bruit or JVD.      Trachea: Trachea and phonation normal.   Cardiovascular:      Rate and Rhythm: Normal rate.      Heart sounds: Normal heart sounds.   Pulmonary:      Effort: Pulmonary effort is normal.      Breath sounds: Normal breath sounds.   Musculoskeletal:      Left hip: He exhibits decreased range of motion and decreased strength.   Skin:     General: Skin is warm and dry.   Neurological:      Mental Status: He is alert and oriented to person, place, and time.      GCS: GCS eye subscore is 4. GCS verbal subscore is 5. GCS motor subscore is 6.      Cranial Nerves: Cranial nerves are intact.      Sensory: Sensory deficit present.      Motor: Weakness, tremor and abnormal muscle tone present.      Coordination: Coordination is intact.      Gait: Gait abnormal.      Deep Tendon Reflexes: Reflexes abnormal.      Reflex Scores:       Bicep reflexes are 2+ on the right side and 3+ on the left side.       Brachioradialis reflexes are 2+ on the right side and 3+ on the left side.       Patellar reflexes are 2+ on the right side and 3+ on the left side.     Comments: Left-sided sensory deficit, particularly to light touch in the upper and lower extremity on the left greater than right.  Increased reflexes on the left.  Overlying orthopedic findings involving the left hip with diminished range of motion, painful range of motion and substantial changes to gait and ambulation.   Psychiatric:         Attention and Perception: Attention normal.         Mood and Affect: Mood is anxious. Affect is labile.         Speech: Speech normal.         Behavior: Behavior is agitated.  Behavior is cooperative.         Thought Content: Thought content is paranoid.         Cognition and Memory: Cognition and memory normal.         Judgment: Judgment is impulsive.           Assessment/Plan   Diagnoses and all orders for this visit:    1. MS (multiple sclerosis) (CMS/HCC) (Primary)  -     Cancel: Stratify JCV, Antibody SANIA With / Reflex To Inhibition Assay  -     CBC & Differential  -     Comprehensive Metabolic Panel  -     Discontinue: Acthar 80 UNIT/ML injectable gel; Inject 1 mL into the appropriate muscle as directed by prescriber Take As Directed. For 10 days  Dispense: 10 mL; Refill: 0  -     CBC Auto Differential  -     Discontinue: Acthar 80 UNIT/ML injectable gel; Inject 1 mL into the appropriate muscle as directed by prescriber Take As Directed. For 10 days  Dispense: 10 mL; Refill: 0    2. Quadriparesis (CMS/HCC)    3. Paresthesias      The patient had multiple questions, Internet data, generalized anxieties, a strong tendency toward self-directed care which extended conversation significantly.  The patient's questions and concerns are addressed as best as possible.  At the conclusion of this conversation the patient was agreeable to consider Tecfidera (dimethyl fumarate)     Follow-up is arranged.    20 minutes of a 25 minute outpatient visit was spent in counseling and coordination of care today.           Dictated utilizing Dragon dictation.

## 2021-02-05 ENCOUNTER — TELEPHONE (OUTPATIENT)
Dept: NEUROLOGY | Facility: CLINIC | Age: 46
End: 2021-02-05

## 2021-02-05 NOTE — TELEPHONE ENCOUNTER
PT CALLED REQUESTING TO SPEAK WITH MARA. PT STATES HE WOULD LIKE TO DISCUSS THE MEDICATIONS MENTIONED IN PREVIOUS ENCOUNTERS.    PT CAN BE REACHED AT AT [phone].    PLEASE REVIEW AND ADVISE.

## 2021-02-24 ENCOUNTER — TELEPHONE (OUTPATIENT)
Dept: NEUROLOGY | Facility: CLINIC | Age: 46
End: 2021-02-24

## 2021-02-24 ENCOUNTER — PATIENT MESSAGE (OUTPATIENT)
Dept: NEUROLOGY | Facility: CLINIC | Age: 46
End: 2021-02-24

## 2021-02-24 NOTE — TELEPHONE ENCOUNTER
DELETE AFTER REVIEWING: Telephone encounter to be sent to the clinical pool   Hub staff attempted to follow warm transfer process and was unsuccessful     Caller: HARLAN ALARCON    Relationship to patient: SELF    Best call back number: [phone]    Patient is needing: PT CALLED TO RETURN MARA'S PHONE CALL. UNABLE TO WARM TRANSFER. PT NEEDING TO DISCUSS MEDICATION CONCERNS.        DELETE AFTER READING TO PATIENT: “I was unable to reach my scheduling contact. I will send a message to the scheduling team. Please allow 48 hours for the  staff to follow up on this request.”

## 2021-02-24 NOTE — TELEPHONE ENCOUNTER
PT IS CALLING BACK TO SPEAK WITH MARA ABOUT MEDICATION CONCERNS  PLEASE CALL BACK @ [phone] PT STATED HE WILL RETURN CALL IN AM AS HE IS NOT FEELING WELL UNABLE TO TRANSFER AS NO ANSWER

## 2021-02-25 NOTE — TELEPHONE ENCOUNTER
Able to contact Parveen today.  He said he took Tecfidera 120 mg for 6 days.  He has irritable bowel syndrome, he had stomach pain, nausea, and diarrhea.  He couldn't tolerate the side effects and is concerned about PML.  He hasn't read any information about Kesimpta, he will look for information about it and let us know if he would like to try it.  He has been sick for a few days, he has a cough.  He tested negative for COVID and is taking antibiotics.

## 2021-03-01 ENCOUNTER — PATIENT MESSAGE (OUTPATIENT)
Dept: NEUROLOGY | Facility: CLINIC | Age: 46
End: 2021-03-01

## 2021-03-03 NOTE — TELEPHONE ENCOUNTER
Parveen notified Jasvir wants to discuss medication options at his next appointment on 3-16-21.  He said he is still coughing, he is going to see his provider again.

## 2021-03-16 ENCOUNTER — OFFICE VISIT (OUTPATIENT)
Dept: NEUROLOGY | Facility: CLINIC | Age: 46
End: 2021-03-16

## 2021-03-16 VITALS
SYSTOLIC BLOOD PRESSURE: 138 MMHG | HEART RATE: 100 BPM | WEIGHT: 280 LBS | OXYGEN SATURATION: 98 % | HEIGHT: 71 IN | BODY MASS INDEX: 39.2 KG/M2 | DIASTOLIC BLOOD PRESSURE: 72 MMHG

## 2021-03-16 DIAGNOSIS — G82.50 QUADRIPARESIS (HCC): ICD-10-CM

## 2021-03-16 DIAGNOSIS — G35 MS (MULTIPLE SCLEROSIS) (HCC): Primary | ICD-10-CM

## 2021-03-16 PROCEDURE — 99215 OFFICE O/P EST HI 40 MIN: CPT | Performed by: PHYSICIAN ASSISTANT

## 2021-03-16 RX ORDER — ALBUTEROL SULFATE 90 UG/1
1 AEROSOL, METERED RESPIRATORY (INHALATION) EVERY 4 HOURS PRN
COMMUNITY
Start: 2021-03-02

## 2021-03-16 RX ORDER — DILTIAZEM HYDROCHLORIDE 60 MG/1
2 TABLET, FILM COATED ORAL
COMMUNITY
Start: 2020-12-19

## 2021-03-16 RX ORDER — OFATUMUMAB 20 MG/.4ML
20 INJECTION, SOLUTION SUBCUTANEOUS WEEKLY
Qty: 3 PEN | Refills: 0 | Status: SHIPPED | OUTPATIENT
Start: 2021-03-16 | End: 2021-05-20 | Stop reason: DRUGHIGH

## 2021-03-16 RX ORDER — CLOBETASOL PROPIONATE 0.46 MG/ML
SOLUTION TOPICAL
COMMUNITY
End: 2021-08-31

## 2021-03-16 RX ORDER — OFATUMUMAB 20 MG/.4ML
20 INJECTION, SOLUTION SUBCUTANEOUS
Qty: 3 PEN | Refills: 3 | Status: SHIPPED | OUTPATIENT
Start: 2021-03-16 | End: 2021-05-20 | Stop reason: SDUPTHER

## 2021-03-16 NOTE — PROGRESS NOTES
Subjective   Andres Murphy is a 46 y.o. male is here today for follow-up.    The reviewer is referred to clinical notes authored by Dr. Slaughter 27 September 2020.  These notes relate directly to today's visit which was offered to the patient to help establish a plan of care for ongoing untreated multiple sclerosis.  Partial summary of these records includes the report of diagnosis of multiple sclerosis in 2000 and treatment with the MS specialist, Dr. Shine in [place], [place].  It is noted that the patient was placed on several different DMT medications including interferons and at least the recommendations for Tysabri and more recently from another physician, Daxa.  There is mention in the history of a potential diagnosis of primary progressive multiple sclerosis versus secondary progressive multiple sclerosis.  The patient is and has been somewhat reticent to proceed with definitive treatment because of anxiety surrounding potential side effects or detrimental effects from therapy.  His disease has progressed essentially as untreated with only episodically been treated with steroids.  This episodic steroid treatment may have contributed to his current problems with avascular necrosis of the left hip.  The patient is here today to discuss the potential for establishing follow-up care as a regular patient.    The patient continues to have symptoms as described in his encounter with Dr. Slaughter.  Left-sided dominant sensory changes with increasing weakness.  The patient continues to have significant symptoms from his avascular necrosis and hip difficulties on the left side as well.  The patient expresses an interest in proceeding with Acthar as discussed previously.  The patient has substantial anxiety over starting disease modifying therapy and wishes to discuss this today.  The patient has a family history of leukemia and he wonders if this predisposes him to adverse events associated with disease  modifying therapy for multiple sclerosis.  The patient has multiple other questions and concerns regarding treating his multiple sclerosis.    We have included in this discussion a alvaro conversation regarding his apparent progressive disability associated with untreated multiple sclerosis.  Patient acknowledges that more than one neurologist has strongly recommended that he proceed with disease modifying therapy.    A 2-week course of Acthar was well-tolerated and the patient feels was very beneficial.    Multiple Sclerosis  This is a chronic problem. The current episode started more than 1 year ago. The problem occurs constantly. The problem has been rapidly worsening. Associated symptoms include abdominal pain, arthralgias, fatigue, headaches, neck pain, numbness, urinary symptoms, a visual change and weakness. The symptoms are aggravated by exertion, stress and walking. He has tried rest, position changes, oral narcotics, lying down and acetaminophen (History of interferon and DMT use, none since 2016) for the symptoms. The treatment provided mild relief.        The following portions of the patient's history were reviewed and updated as appropriate: allergies, current medications, past family history, past medical history, past social history, past surgical history and problem list.    Review of Systems   Constitutional: Positive for fatigue.   HENT: Negative for trouble swallowing.    Eyes: Positive for photophobia and visual disturbance.   Respiratory: Negative.    Cardiovascular: Negative.    Gastrointestinal: Positive for abdominal pain, diarrhea, GERD and indigestion.   Genitourinary: Negative.    Musculoskeletal: Positive for arthralgias, back pain, gait problem and neck pain.   Skin: Negative.    Neurological: Positive for weakness, numbness and headache.   Hematological: Negative.    Psychiatric/Behavioral: Positive for dysphoric mood and sleep disturbance. The patient is nervous/anxious.           Current Outpatient Medications:   •  albuterol sulfate  (90 Base) MCG/ACT inhaler, Inhale 1 puff Every 4 (Four) Hours As Needed., Disp: , Rfl:   •  Ascorbic Acid (VITAMIN C PO), Take  by mouth Daily., Disp: , Rfl:   •  baclofen (LIORESAL) 20 MG tablet, Take 20 mg by mouth 4 (Four) Times a Day. Alternating with Zanaflex, Disp: , Rfl:   •  clobetasol (TEMOVATE) 0.05 % external solution, clobetasol 0.05 % scalp solution  APPLY TO THE AFFECTED SCALP AREA BY TOPICAL ROUTE QHS for one week as needed for dry itchy scalp, Disp: , Rfl:   •  dicyclomine (BENTYL) 10 MG capsule, Take 10 mg by mouth 4 (Four) Times a Day Before Meals & at Bedtime., Disp: , Rfl:   •  diphenhydrAMINE (BENADRYL) 25 mg capsule, Take 25 mg by mouth At Night As Needed for Allergies or Sleep., Disp: , Rfl:   •  HYDROcodone-acetaminophen (NORCO)  MG per tablet, Take 1 tablet by mouth 4 (Four) Times a Day., Disp: , Rfl:   •  hydrOXYzine (ATARAX) 25 MG tablet, Take 25 mg by mouth 2 (two) times a day., Disp: , Rfl:   •  lisinopril (PRINIVIL,ZESTRIL) 10 MG tablet, Take 10 mg by mouth Daily., Disp: , Rfl:   •  Melatonin 10 MG tablet, Take 20 mg by mouth Every Night. Takes 2 10mg tablets, Disp: , Rfl:   •  meloxicam (MOBIC) 15 MG tablet, Take 15 mg by mouth Daily., Disp: , Rfl:   •  Morphine (MSIR) 15 MG tablet, Take 15 mg by mouth 2 (Two) Times a Day., Disp: , Rfl:   •  pantoprazole (PROTONIX) 40 MG EC tablet, Take 40 mg by mouth Daily., Disp: , Rfl:   •  Symbicort 80-4.5 MCG/ACT inhaler, Inhale 2 puffs 2 (Two) Times a Day., Disp: , Rfl:   •  tadalafil (CIALIS) 5 MG tablet, Take 5 mg by mouth Daily As Needed for Erectile Dysfunction., Disp: , Rfl:   •  tiZANidine (ZANAFLEX) 4 MG tablet, Take 4 mg by mouth 4 (Four) Times a Day. Alternating with baclofen., Disp: , Rfl:   •  valACYclovir HCl (VALTREX PO), Take  by mouth Daily As Needed., Disp: , Rfl:   •  VITAMIN D PO, Take  by mouth Daily., Disp: , Rfl:   •  Kesimpta 20 MG/0.4ML solution  auto-injector, Inject 20 mg under the skin into the appropriate area as directed 1 (One) Time Per Week. Weeks 0, 1, 2, Disp: 3 pen, Rfl: 0  •  Kesimpta 20 MG/0.4ML solution auto-injector, Inject 20 mg under the skin into the appropriate area as directed Every 30 (Thirty) Days. Monthly starting at week 4, Disp: 3 pen, Rfl: 3     Objective   Physical Exam  Vitals and nursing note reviewed.   HENT:      Head: Normocephalic.      Right Ear: Hearing and external ear normal.      Left Ear: Hearing and external ear normal.      Mouth/Throat:      Pharynx: Oropharynx is clear.   Eyes:      General: Lids are normal. Vision grossly intact. Gaze aligned appropriately. No scleral icterus.     Extraocular Movements: Extraocular movements intact.      Conjunctiva/sclera: Conjunctivae normal.      Pupils: Pupils are equal, round, and reactive to light.      Visual Fields: Right eye visual fields normal and left eye visual fields normal.   Neck:      Vascular: No carotid bruit or JVD.      Trachea: Trachea and phonation normal.   Cardiovascular:      Rate and Rhythm: Normal rate.      Heart sounds: Normal heart sounds.   Pulmonary:      Effort: Pulmonary effort is normal.      Breath sounds: Normal breath sounds.   Musculoskeletal:      Cervical back: Normal range of motion.      Left hip: Decreased range of motion. Decreased strength.   Skin:     General: Skin is warm and dry.   Neurological:      Mental Status: He is alert and oriented to person, place, and time.      GCS: GCS eye subscore is 4. GCS verbal subscore is 5. GCS motor subscore is 6.      Cranial Nerves: Cranial nerves are intact.      Sensory: Sensory deficit present.      Motor: Weakness, tremor and abnormal muscle tone present.      Coordination: Coordination is intact.      Gait: Gait abnormal.      Deep Tendon Reflexes: Reflexes abnormal.      Reflex Scores:       Bicep reflexes are 2+ on the right side and 3+ on the left side.       Brachioradialis reflexes are  2+ on the right side and 3+ on the left side.       Patellar reflexes are 2+ on the right side and 3+ on the left side.     Comments: Left-sided sensory deficit, particularly to light touch in the upper and lower extremity on the left greater than right.  Increased reflexes on the left.  Overlying orthopedic findings involving the left hip with diminished range of motion, painful range of motion and substantial changes to gait and ambulation.   Psychiatric:         Attention and Perception: Attention normal.         Mood and Affect: Affect normal. Mood is anxious.         Speech: Speech normal.         Behavior: Behavior normal.         Thought Content: Thought content normal.         Cognition and Memory: Cognition and memory normal.         Judgment: Judgment normal.           Assessment/Plan   Diagnoses and all orders for this visit:    1. MS (multiple sclerosis) (CMS/HCC) (Primary)  -     Kesimpta 20 MG/0.4ML solution auto-injector; Inject 20 mg under the skin into the appropriate area as directed 1 (One) Time Per Week. Weeks 0, 1, 2  Dispense: 3 pen; Refill: 0  -     Kesimpta 20 MG/0.4ML solution auto-injector; Inject 20 mg under the skin into the appropriate area as directed Every 30 (Thirty) Days. Monthly starting at week 4  Dispense: 3 pen; Refill: 3  -     Hepatitis B Core Antibody, IgM  -     Hepatitis B Surface Antigen  -     Immunoglobulins Quant  -     Stratify JCV(TM) Ab w/ Index ([facility])  -     MRI Brain With & Without Contrast; Future    2. Quadriparesis (CMS/HCC)    I have advised the patient that if he consents to Covid vaccination, then he should proceed as soon as possible prior to beginning therapy.    I have urged the patient to follow-up with orthopedics regarding repair of his hip.  I have emphasized to him that multiple sclerosis is not a contraindication to proceeding with treatment for his collapsed left hip secondary to osteonecrosis.    The patient needs a follow-up MRI of the brain  with and without contrast which should be completed at a 3T facility.  MRI is necessary because of advanced disease noted in October 2020 and interim progression of his symptoms.    Appropriate pretreatment labs are requested.  The patient has a substantial anxiety towards the occurrence of PML and has a positive JCV previously, we will include a repeat JCV titer.    An extensive conversation with the patient which is a follow-up to previous extensive conversations regarding appropriate treatment for his multiple sclerosis.  The patient has not been on disease modifying therapy for quite some time and has had progression of weakness and disability.  The patient has self-directed care and avoided disease modifying therapy based upon what is felt represents an exaggerated concern for the occurrence of PML.  The occurrence of PML occurring in MS patients treated with disease modifying therapy has been reported, and is rare.  We have reviewed the patient's concerns and contrasted these with occurrence rates and spent quite a bit of time framing concerns, risks, benefits, study data and expectations of various disease modifying therapies and the rationale for proceeding with certain disease modifying therapies over others.  The patient's record reflects opinions from [facility] that include the possibilities of primary progressive MS or relapsing remitting MS with secondary progression.    The patient is now agreeable to proceeding with disease modifying therapy.  We have discussed the appropriateness of several today.  We have discussed the potential to use ofatumumab (Kesimpta) and ocrelizumab (Ocrevus).  We have also discussed the reasons why we do not recommend teriflunomide (Aubagio) or interferon beta medications.  Patient had several questions regarding these medications and we have reviewed in detail.    At the present time, I have recommended that the patient begin the process for starting Kesimpta.   I have reviewed present data on the use of Kesimpta, with data shows no reported occurrence of PML with the use of Kesimpta for multiple sclerosis.  Ofatumumab at higher doses for other indications (presently 1 case, 2020, IV use for CLL) has been associated with PML.  Other complications including a 2.5% rate for serious infections and the possibility of immunoglobulin deficiency, reactivation of infection such as hepatitis B are discussed and reviewed.    The patient expresses a willingness to begin therapy with the Kesimpta.  Start forms were reviewed with the patient.  The importance of lab work and monitoring follow-up lab work as well as a baseline MRI and follow-up MRIs depending on findings and symptoms are reviewed.  The necessity of compliance is reviewed thoroughly.  I anticipate the patient will return in follow-up prior to starting Kesimpta.  At that time, we will review labs and his MRI images.    The patient's questions and concerns are reviewed.               Dictated utilizing Dragon dictation.

## 2021-03-24 ENCOUNTER — PATIENT MESSAGE (OUTPATIENT)
Dept: NEUROLOGY | Facility: CLINIC | Age: 46
End: 2021-03-24

## 2021-03-26 NOTE — TELEPHONE ENCOUNTER
Andres notified Jasvir said he can call him after the MRI, he wants him to return in a few months.

## 2021-03-30 ENCOUNTER — TELEPHONE (OUTPATIENT)
Dept: NEUROLOGY | Facility: CLINIC | Age: 46
End: 2021-03-30

## 2021-03-30 NOTE — TELEPHONE ENCOUNTER
Pharm[facility] Name:  [facility]    Pharmacy representative name: KELYL FLEMING    Pharmacy representative phone number: [phone]    What medication are you calling in regards to: KESIMPTA    Additional notes: PT'S INSUARANCE CHANGED THEIR P.A FORM , SHE IS GOING TO FAX OVER NEW PA FORM TO OFFICE AND THEY WILL ALSO CHANGE THE FORM ON COVER MY MEDS AS WELL.     PA KEY: [id number]

## 2021-03-31 ENCOUNTER — LAB (OUTPATIENT)
Dept: LAB | Facility: HOSPITAL | Age: 46
End: 2021-03-31

## 2021-03-31 ENCOUNTER — HOSPITAL ENCOUNTER (OUTPATIENT)
Dept: MRI IMAGING | Facility: HOSPITAL | Age: 46
Discharge: HOME | End: 2021-03-31

## 2021-03-31 ENCOUNTER — PATIENT MESSAGE (OUTPATIENT)
Dept: NEUROLOGY | Facility: CLINIC | Age: 46
End: 2021-03-31

## 2021-03-31 DIAGNOSIS — G35 MS (MULTIPLE SCLEROSIS) (HCC): ICD-10-CM

## 2021-03-31 LAB
BASOPHILS # BLD AUTO: 0.05 10*3/MM3 (ref 0–0.2)
BASOPHILS NFR BLD AUTO: 0.7 % (ref 0–1.5)
CREAT BLDA-MCNC: 0.8 MG/DL (ref 0.6–1.3)
DEPRECATED RDW RBC AUTO: 45.1 FL (ref 37–54)
EOSINOPHIL # BLD AUTO: 0.26 10*3/MM3 (ref 0–0.4)
EOSINOPHIL NFR BLD AUTO: 3.8 % (ref 0.3–6.2)
ERYTHROCYTE [DISTWIDTH] IN BLOOD BY AUTOMATED COUNT: 13.2 % (ref 12.3–15.4)
HCT VFR BLD AUTO: 38.9 % (ref 37.5–51)
HGB BLD-MCNC: 13 G/DL (ref 13–17.7)
IMM GRANULOCYTES # BLD AUTO: 0.07 10*3/MM3 (ref 0–0.05)
IMM GRANULOCYTES NFR BLD AUTO: 1 % (ref 0–0.5)
LYMPHOCYTES # BLD AUTO: 1.07 10*3/MM3 (ref 0.7–3.1)
LYMPHOCYTES NFR BLD AUTO: 15.6 % (ref 19.6–45.3)
MCH RBC QN AUTO: 31 PG (ref 26.6–33)
MCHC RBC AUTO-ENTMCNC: 33.4 G/DL (ref 31.5–35.7)
MCV RBC AUTO: 92.6 FL (ref 79–97)
MONOCYTES # BLD AUTO: 0.51 10*3/MM3 (ref 0.1–0.9)
MONOCYTES NFR BLD AUTO: 7.5 % (ref 5–12)
NEUTROPHILS NFR BLD AUTO: 4.88 10*3/MM3 (ref 1.7–7)
NEUTROPHILS NFR BLD AUTO: 71.4 % (ref 42.7–76)
NRBC BLD AUTO-RTO: 0 /100 WBC (ref 0–0.2)
PLATELET # BLD AUTO: 255 10*3/MM3 (ref 140–450)
PMV BLD AUTO: 9.5 FL (ref 6–12)
RBC # BLD AUTO: 4.2 10*6/MM3 (ref 4.14–5.8)
WBC # BLD AUTO: 6.84 10*3/MM3 (ref 3.4–10.8)

## 2021-03-31 PROCEDURE — 82565 ASSAY OF CREATININE: CPT

## 2021-03-31 PROCEDURE — 85025 COMPLETE CBC W/AUTO DIFF WBC: CPT | Performed by: PHYSICIAN ASSISTANT

## 2021-03-31 PROCEDURE — 82784 ASSAY IGA/IGD/IGG/IGM EACH: CPT | Performed by: PHYSICIAN ASSISTANT

## 2021-03-31 PROCEDURE — 87340 HEPATITIS B SURFACE AG IA: CPT | Performed by: PHYSICIAN ASSISTANT

## 2021-03-31 PROCEDURE — 82785 ASSAY OF IGE: CPT | Performed by: PHYSICIAN ASSISTANT

## 2021-03-31 PROCEDURE — 70553 MRI BRAIN STEM W/O & W/DYE: CPT

## 2021-03-31 PROCEDURE — 86705 HEP B CORE ANTIBODY IGM: CPT | Performed by: PHYSICIAN ASSISTANT

## 2021-03-31 PROCEDURE — 0 GADOBENATE DIMEGLUMINE 529 MG/ML SOLUTION: Performed by: PHYSICIAN ASSISTANT

## 2021-03-31 PROCEDURE — 36415 COLL VENOUS BLD VENIPUNCTURE: CPT | Performed by: PHYSICIAN ASSISTANT

## 2021-03-31 PROCEDURE — A9577 INJ MULTIHANCE: HCPCS | Performed by: PHYSICIAN ASSISTANT

## 2021-03-31 RX ADMIN — GADOBENATE DIMEGLUMINE 20 ML: 529 INJECTION, SOLUTION INTRAVENOUS at 15:29

## 2021-04-01 DIAGNOSIS — G35 MS (MULTIPLE SCLEROSIS) (HCC): Primary | ICD-10-CM

## 2021-04-01 LAB
HBV CORE IGM SERPL QL IA: NORMAL
HBV SURFACE AG SERPL QL IA: NORMAL

## 2021-04-03 LAB
IGA SERPL-MCNC: 157 MG/DL (ref 90–386)
IGE SERPL-ACNC: 132 IU/ML (ref 6–495)
IGG SERPL-MCNC: 750 MG/DL (ref 603–1613)
IGM SERPL-MCNC: 39 MG/DL (ref 20–172)

## 2021-04-08 LAB
CONV INDEX VALUE: 1.82
INTERPRETATION: ABNORMAL
INTERPRETATION: ABNORMAL
JCPYV AB SERPL QL IA: POSITIVE

## 2021-05-13 ENCOUNTER — TELEPHONE (OUTPATIENT)
Dept: NEUROLOGY | Facility: CLINIC | Age: 46
End: 2021-05-13

## 2021-05-18 ENCOUNTER — PATIENT MESSAGE (OUTPATIENT)
Dept: NEUROLOGY | Facility: CLINIC | Age: 46
End: 2021-05-18

## 2021-05-18 NOTE — TELEPHONE ENCOUNTER
Hub staff attempted to follow warm transfer process and was unsuccessful     Caller: Andres Murphy    Relationship to patient: Self    Best call back number: [phone]    Patient is needing: TO SPEAK WITH MARA. UNABLE TO WARM TRANSFER CALL AS I WAS UNABLE TO REACH ANYONE IN OFFICE.    PLEASE REVIEW AND ADVISE.

## 2021-05-19 NOTE — TELEPHONE ENCOUNTER
Able to contact Parveen today, he said he doesn't have liver problems and if he marked it on the form he didn't intend to.  [facility] notified.

## 2021-05-20 DIAGNOSIS — G35 MS (MULTIPLE SCLEROSIS) (HCC): Primary | ICD-10-CM

## 2021-05-20 RX ORDER — OFATUMUMAB 20 MG/.4ML
20 INJECTION, SOLUTION SUBCUTANEOUS
Qty: 1 PEN | Refills: 5 | Status: SHIPPED | OUTPATIENT
Start: 2021-05-20 | End: 2022-01-18

## 2021-06-11 ENCOUNTER — TELEPHONE (OUTPATIENT)
Dept: NEUROLOGY | Facility: CLINIC | Age: 46
End: 2021-06-11

## 2021-06-11 NOTE — TELEPHONE ENCOUNTER
Left pt a detailed msg asking about his new M S med & to discuss rescheduling his upcoming appt. Pt has been given my direct line or asked to send a msg via [employer].

## 2021-06-15 ENCOUNTER — TELEPHONE (OUTPATIENT)
Dept: NEUROLOGY | Facility: CLINIC | Age: 46
End: 2021-06-15

## 2021-06-16 NOTE — TELEPHONE ENCOUNTER
Provider: RUFINO COLVIN    Caller: ALEXANDRIA ALARCON    Relationship to Patient: SELF     Pharmacy: NA    Phone Number: [phone]    Reason for Call: THE PATIENT CALLED BACK TO LET US KNOW THAT HE IS OKAY WITH AUG 31 ST APPOINTMENT, AND DO NOT NEED TO R/S.

## 2021-07-22 ENCOUNTER — PATIENT MESSAGE (OUTPATIENT)
Dept: NEUROLOGY | Facility: CLINIC | Age: 46
End: 2021-07-22

## 2021-07-26 NOTE — TELEPHONE ENCOUNTER
[facility] said they were notified in May that the hepatitis results were negative.  Message left for Andres requesting a return call.

## 2021-07-26 NOTE — TELEPHONE ENCOUNTER
Andres notified [facility] said they were notified in May that the hepatitis results were negative.

## 2021-08-17 ENCOUNTER — TELEPHONE (OUTPATIENT)
Dept: NEUROLOGY | Facility: CLINIC | Age: 46
End: 2021-08-17

## 2021-08-17 NOTE — TELEPHONE ENCOUNTER
Caller: Andres Murphy    Relationship: Self    Best call back number: [phone]    What medications are you currently taking:   Current Outpatient Medications on File Prior to Visit   Medication Sig Dispense Refill   • albuterol sulfate  (90 Base) MCG/ACT inhaler Inhale 1 puff Every 4 (Four) Hours As Needed.     • Ascorbic Acid (VITAMIN C PO) Take  by mouth Daily.     • baclofen (LIORESAL) 20 MG tablet Take 20 mg by mouth 4 (Four) Times a Day. Alternating with Zanaflex     • clobetasol (TEMOVATE) 0.05 % external solution clobetasol 0.05 % scalp solution   APPLY TO THE AFFECTED SCALP AREA BY TOPICAL ROUTE QHS for one week as needed for dry itchy scalp     • dicyclomine (BENTYL) 10 MG capsule Take 10 mg by mouth 4 (Four) Times a Day Before Meals & at Bedtime.     • diphenhydrAMINE (BENADRYL) 25 mg capsule Take 25 mg by mouth At Night As Needed for Allergies or Sleep.     • HYDROcodone-acetaminophen (NORCO)  MG per tablet Take 1 tablet by mouth 4 (Four) Times a Day.     • hydrOXYzine (ATARAX) 25 MG tablet Take 25 mg by mouth 2 (two) times a day.     • Kesimpta 20 MG/0.4ML solution auto-injector Inject 20 mg under the skin into the appropriate area as directed Every 30 (Thirty) Days. 1 pen 5   • lisinopril (PRINIVIL,ZESTRIL) 10 MG tablet Take 10 mg by mouth Daily.     • Melatonin 10 MG tablet Take 20 mg by mouth Every Night. Takes 2 10mg tablets     • meloxicam (MOBIC) 15 MG tablet Take 15 mg by mouth Daily.     • Morphine (MSIR) 15 MG tablet Take 15 mg by mouth 2 (Two) Times a Day.     • pantoprazole (PROTONIX) 40 MG EC tablet Take 40 mg by mouth Daily.     • Symbicort 80-4.5 MCG/ACT inhaler Inhale 2 puffs 2 (Two) Times a Day.     • tadalafil (CIALIS) 5 MG tablet Take 5 mg by mouth Daily As Needed for Erectile Dysfunction.     • tiZANidine (ZANAFLEX) 4 MG tablet Take 4 mg by mouth 4 (Four) Times a Day. Alternating with baclofen.     • valACYclovir HCl (VALTREX PO) Take  by mouth Daily As  Needed.     • VITAMIN D PO Take  by mouth Daily.       No current facility-administered medications on file prior to visit.          When did you start taking these medications: NA    Which medication are you concerned about: KESIMPTA    Who prescribed you this medication: MARII JOY    What are your concerns: PATIENT STATES HE HAS STILL BEEN FEELING SICK AND HAS BEEN ON ANTIBIOTICS FOR UPPER RESPIRATORY AND SINUS INFECTION, HIS 2ND ROUND NOW AND WANTS TO MAKE SURE ITS OKAY TO TAKE THE KESIMPTA SHOTS WHILE ON ANTIBIOTICS. HE IS ALSO WANTING TO KNOW IF HE CAN CHANGE HIS UPCOMING APPOINTMENT TO VIDEO VISIT. PLEASE ADVISE.     How long have you had these concerns: NA

## 2021-08-17 NOTE — TELEPHONE ENCOUNTER
----- Message from TAMEKA Alvarez sent at 8/17/2021  2:25 PM CDT -----  It is fine to take the Kesimpta, he should take it on his regular schedule.  He can be a video visit for this visit.    ----- Message -----  From: Loulou Hilton LPN  Sent: 8/17/2021   2:11 PM CDT  To: TAMEKA Alvarez    Received this message from the hub.    What are your concerns: PATIENT STATES HE HAS STILL BEEN FEELING SICK AND HAS BEEN ON ANTIBIOTICS FOR UPPER RESPIRATORY AND SINUS INFECTION, HIS 2ND ROUND NOW AND WANTS TO MAKE SURE ITS OKAY TO TAKE THE KESIMPTA SHOTS WHILE ON ANTIBIOTICS. HE IS ALSO WANTING TO KNOW IF HE CAN CHANGE HIS UPCOMING APPOINTMENT TO VIDEO VISIT. PLEASE ADVISE.

## 2021-08-31 ENCOUNTER — TELEMEDICINE (OUTPATIENT)
Dept: NEUROLOGY | Facility: CLINIC | Age: 46
End: 2021-08-31

## 2021-08-31 DIAGNOSIS — G35 MS (MULTIPLE SCLEROSIS) (HCC): Primary | ICD-10-CM

## 2021-08-31 PROCEDURE — 99423 OL DIG E/M SVC 21+ MIN: CPT | Performed by: PHYSICIAN ASSISTANT

## 2021-08-31 RX ORDER — CITALOPRAM 10 MG/1
5 TABLET ORAL DAILY
COMMUNITY
Start: 2021-07-09 | End: 2022-10-24 | Stop reason: DRUGHIGH

## 2021-09-26 NOTE — PROGRESS NOTES
This visit was conducted via telehealth/[employer] (patient portal).     Subjective   Andres Murphy is a 46 y.o. male is here today for follow-up.    Per VINCENZO HARDY; Follow-up- Patient has concerns about muscle cramps, fatigue, headaches and cough.  Cough and leg cramps started with injections.       The following portions of the patient's history were reviewed and updated as appropriate: allergies, current medications, past family history, past medical history, past social history, past surgical history and problem list.        Review of Systems   Constitutional: Positive for fatigue.   HENT: Negative for trouble swallowing.    Eyes: Positive for photophobia and visual disturbance.   Respiratory: Negative.    Cardiovascular: Negative.    Gastrointestinal: Positive for abdominal pain, diarrhea, GERD and indigestion.   Genitourinary: Negative.    Musculoskeletal: Positive for arthralgias, back pain, gait problem and neck pain.   Skin: Negative.    Neurological: Positive for weakness, numbness and headache.   Hematological: Negative.    Psychiatric/Behavioral: Positive for dysphoric mood and sleep disturbance. The patient is nervous/anxious.          Current Outpatient Medications:   •  albuterol sulfate  (90 Base) MCG/ACT inhaler, Inhale 1 puff Every 4 (Four) Hours As Needed., Disp: , Rfl:   •  Ascorbic Acid (VITAMIN C PO), Take  by mouth Daily., Disp: , Rfl:   •  baclofen (LIORESAL) 20 MG tablet, Take 20 mg by mouth 4 (Four) Times a Day. Alternating with Zanaflex, Disp: , Rfl:   •  citalopram (CeleXA) 10 MG tablet, 0.5 tablets Daily., Disp: , Rfl:   •  dicyclomine (BENTYL) 10 MG capsule, Take 10 mg by mouth 4 (Four) Times a Day Before Meals & at Bedtime., Disp: , Rfl:   •  diphenhydrAMINE (BENADRYL) 25 mg capsule, Take 25 mg by mouth At Night As Needed for Allergies or Sleep., Disp: , Rfl:   •  HYDROcodone-acetaminophen (NORCO)  MG per tablet, Take 1 tablet by mouth 4 (Four) Times a Day., Disp: ,  Rfl:   •  hydrOXYzine (ATARAX) 25 MG tablet, Take 25 mg by mouth 2 (two) times a day., Disp: , Rfl:   •  Kesimpta 20 MG/0.4ML solution auto-injector, Inject 20 mg under the skin into the appropriate area as directed Every 30 (Thirty) Days., Disp: 1 pen, Rfl: 5  •  lisinopril (PRINIVIL,ZESTRIL) 10 MG tablet, Take 10 mg by mouth Daily., Disp: , Rfl:   •  Melatonin 10 MG tablet, Take 20 mg by mouth At Night As Needed. Takes 2 10mg tablets , Disp: , Rfl:   •  meloxicam (MOBIC) 15 MG tablet, Take 15 mg by mouth Daily., Disp: , Rfl:   •  Morphine (MSIR) 15 MG tablet, Take 15 mg by mouth 2 (Two) Times a Day., Disp: , Rfl:   •  pantoprazole (PROTONIX) 40 MG EC tablet, Take 40 mg by mouth Daily., Disp: , Rfl:   •  Symbicort 80-4.5 MCG/ACT inhaler, Inhale 2 puffs 2 (Two) Times a Day., Disp: , Rfl:   •  tadalafil (CIALIS) 5 MG tablet, Take 5 mg by mouth Daily As Needed for Erectile Dysfunction., Disp: , Rfl:   •  tiZANidine (ZANAFLEX) 4 MG tablet, Take 4 mg by mouth 4 (Four) Times a Day. Alternating with baclofen., Disp: , Rfl:   •  valACYclovir HCl (VALTREX PO), Take  by mouth Daily As Needed., Disp: , Rfl:   •  VITAMIN D PO, Take  by mouth Daily., Disp: , Rfl:      Objective   Physical Exam  Vitals and nursing note reviewed.   Neurological:      Mental Status: He is alert.      GCS: GCS eye subscore is 4. GCS verbal subscore is 5. GCS motor subscore is 6.   Psychiatric:         Attention and Perception: Attention and perception normal.         Mood and Affect: Mood is anxious.         Speech: Speech normal.         Behavior: Behavior is agitated.         Thought Content: Thought content normal.         Judgment: Judgment is impulsive.           Assessment/Plan   Diagnoses and all orders for this visit:    1. MS (multiple sclerosis) (CMS/[facility]) (Primary)  -     Immunoglobulins Quant; Future  -     CBC & Differential; Future  -     Comprehensive Metabolic Panel; Future  -     Stratify JCV(TM) Ab w/ Index ([facility]); Future  -      MRI Brain With & Without Contrast; Future                     Patient voices understanding and agreement with plan of care. Advised to notify the office of any additional concerns or questions in the interim.     This was a 30 minute video teleconference.      Dictated utilizing Dragon dictation.

## 2021-09-27 DIAGNOSIS — G35 MS (MULTIPLE SCLEROSIS) (HCC): ICD-10-CM

## 2021-10-11 ENCOUNTER — TELEPHONE (OUTPATIENT)
Dept: NEUROLOGY | Facility: CLINIC | Age: 46
End: 2021-10-11

## 2021-10-11 DIAGNOSIS — G35 MS (MULTIPLE SCLEROSIS) (HCC): ICD-10-CM

## 2021-10-11 NOTE — TELEPHONE ENCOUNTER
Caller: [facility] - [place], [place] - [address] [facility] [phone] [facility] [phone] FX    Relationship: Pharmacy    Best call back number: [phone]    What medications are you currently taking:   Current Outpatient Medications on File Prior to Visit   Medication Sig Dispense Refill   • albuterol sulfate  (90 Base) MCG/ACT inhaler Inhale 1 puff Every 4 (Four) Hours As Needed.     • Ascorbic Acid (VITAMIN C PO) Take  by mouth Daily.     • baclofen (LIORESAL) 20 MG tablet Take 20 mg by mouth 4 (Four) Times a Day. Alternating with Zanaflex     • citalopram (CeleXA) 10 MG tablet 0.5 tablets Daily.     • dicyclomine (BENTYL) 10 MG capsule Take 10 mg by mouth 4 (Four) Times a Day Before Meals & at Bedtime.     • diphenhydrAMINE (BENADRYL) 25 mg capsule Take 25 mg by mouth At Night As Needed for Allergies or Sleep.     • HYDROcodone-acetaminophen (NORCO)  MG per tablet Take 1 tablet by mouth 4 (Four) Times a Day.     • hydrOXYzine (ATARAX) 25 MG tablet Take 25 mg by mouth 2 (two) times a day.     • Kesimpta 20 MG/0.4ML solution auto-injector Inject 20 mg under the skin into the appropriate area as directed Every 30 (Thirty) Days. 1 pen 5   • lisinopril (PRINIVIL,ZESTRIL) 10 MG tablet Take 10 mg by mouth Daily.     • Melatonin 10 MG tablet Take 20 mg by mouth At Night As Needed. Takes 2 10mg tablets      • meloxicam (MOBIC) 15 MG tablet Take 15 mg by mouth Daily.     • Morphine (MSIR) 15 MG tablet Take 15 mg by mouth 2 (Two) Times a Day.     • pantoprazole (PROTONIX) 40 MG EC tablet Take 40 mg by mouth Daily.     • Symbicort 80-4.5 MCG/ACT inhaler Inhale 2 puffs 2 (Two) Times a Day.     • tadalafil (CIALIS) 5 MG tablet Take 5 mg by mouth Daily As Needed for Erectile Dysfunction.     • tiZANidine (ZANAFLEX) 4 MG tablet Take 4 mg by mouth 4 (Four) Times a Day. Alternating with baclofen.     • valACYclovir HCl (VALTREX PO) Take  by mouth Daily As Needed.     • VITAMIN D PO Take  by  mouth Daily.       No current facility-administered medications on file prior to visit.          When did you start taking these medications: NA    Which medication are you concerned about: KESIMPTA    Who prescribed you this medication: MARII JOY    What are your concerns: [facility] STATES THAT PATIENT SPOKE WITH THEM AND SAID IN LAST 3 MONTHS HE HAS EXPERIENCED BRUISING, FATIGUE, MUSCLE CRAMPING AND UPPER RESPIRATORY INFECTIONS. THEY WANTED TO MAKE OFFICE AWARE THAT HE TOLD THEM THAT. THEY SAID THEY ARE STILL PLANNING ON SHIPPING OUT MEDICATION AS SCHEDULED. PLEASE ADVISE.     How long have you had these concerns: NA

## 2021-10-27 ENCOUNTER — TELEPHONE (OUTPATIENT)
Dept: NEUROLOGY | Facility: CLINIC | Age: 46
End: 2021-10-27

## 2021-10-27 NOTE — TELEPHONE ENCOUNTER
LM LETTING MR. ALARCON KNOW THAT THE MESSAGE I JUST LEFT HIM REGARDING THE MRI IS INCORRECT.     THE CORRECTED INFORMATION IS:    MRI SCHEDULED AT   [address] [address]  [phone]    11/03/2021   ARRIVE 3:00 FOR 3:30 APPOINTMENT    PLEASE CALL OUR OFFICE FOR ANY QUESTIONS. I AM AVAILABLE ON MONDAYS & TUESDAYS -[phone]

## 2021-10-28 NOTE — TELEPHONE ENCOUNTER
Caller: Berry, Andreslisbeth Don    Relationship: Self    Best call back number: [phone]    What was the call regarding: PT RETURNING PHONE CALL TO ARIAS. INFORMED HIM OF [facility]AS'S MESSAGE. PT STATED THE TIME/DATE OF MRI WOULD NOT WORK FOR HIM. I PROVIDED PT THE PHONE # TO [facility] TO CALL & RESCHEDULE MRI. INSTRUCTED PT TO CALL BACK IF THEY WOULD NOT ALLOW HIM TO RESCHEDULE THE MRI HIMSELF. PT VU.    DOCUMENTING PER [employer].

## 2021-11-22 ENCOUNTER — TELEPHONE (OUTPATIENT)
Dept: NEUROLOGY | Facility: CLINIC | Age: 46
End: 2021-11-22

## 2021-11-22 NOTE — TELEPHONE ENCOUNTER
Name:  KEYON     representative name:  JENIFFER  DAVID     Representative phone number [phone]    Whatare you calling in regards to: DENIED TELEHEALTH  VISIT NOT COVERED AT THE TIME  WILL YOU   SEND IN A CORRECTED CLAIM WITH IMMUNOCOMPROMISED CODE SO THEY CAN PERHAPS ADJUST THIS DENIAL          Who is the provider that prescribed the medication:  LINDSEY JOY     Additional notes:  PLEASE ADVISE

## 2021-11-30 ENCOUNTER — TELEPHONE (OUTPATIENT)
Dept: NEUROLOGY | Facility: CLINIC | Age: 46
End: 2021-11-30

## 2021-11-30 NOTE — TELEPHONE ENCOUNTER
Provider: RUFINO  Caller: PT  Relationship to Patient: SELF  Pharmacy: N/A  Phone Number: [phone]  Reason for Call: PT TEL TO ADVISE THAT [facility] NEEDS A PRE-CERTIFICATION PRIOR TO COMPLETING HIS MRI.    PLEASE CALL THEM @ [phone] RE: THIS PRE CERT.    THANK YOU.

## 2021-12-02 DIAGNOSIS — G35 MS (MULTIPLE SCLEROSIS) (HCC): ICD-10-CM

## 2022-01-17 DIAGNOSIS — G35 MS (MULTIPLE SCLEROSIS): ICD-10-CM

## 2022-01-18 RX ORDER — OFATUMUMAB 20 MG/.4ML
20 INJECTION, SOLUTION SUBCUTANEOUS
Qty: 1 PEN | Refills: 1 | Status: SHIPPED | OUTPATIENT
Start: 2022-01-18 | End: 2022-04-19

## 2022-03-17 DIAGNOSIS — G35 MS (MULTIPLE SCLEROSIS): Primary | ICD-10-CM

## 2022-03-17 DIAGNOSIS — G82.50 QUADRIPARESIS: ICD-10-CM

## 2022-04-04 DIAGNOSIS — G82.50 QUADRIPARESIS: ICD-10-CM

## 2022-04-04 DIAGNOSIS — G35 MS (MULTIPLE SCLEROSIS): ICD-10-CM

## 2022-04-19 DIAGNOSIS — G35 MS (MULTIPLE SCLEROSIS): ICD-10-CM

## 2022-04-19 RX ORDER — OFATUMUMAB 20 MG/.4ML
20 INJECTION, SOLUTION SUBCUTANEOUS
Qty: 1 PEN | Refills: 1 | Status: SHIPPED | OUTPATIENT
Start: 2022-04-19 | End: 2022-07-08

## 2022-05-02 ENCOUNTER — OFFICE VISIT (OUTPATIENT)
Dept: NEUROLOGY | Facility: CLINIC | Age: 47
End: 2022-05-02

## 2022-05-02 VITALS
OXYGEN SATURATION: 99 % | HEIGHT: 71 IN | WEIGHT: 260 LBS | BODY MASS INDEX: 36.4 KG/M2 | HEART RATE: 92 BPM | SYSTOLIC BLOOD PRESSURE: 128 MMHG | DIASTOLIC BLOOD PRESSURE: 80 MMHG

## 2022-05-02 DIAGNOSIS — G82.50 QUADRIPARESIS: ICD-10-CM

## 2022-05-02 DIAGNOSIS — G35 MS (MULTIPLE SCLEROSIS): Primary | ICD-10-CM

## 2022-05-02 PROCEDURE — 99215 OFFICE O/P EST HI 40 MIN: CPT | Performed by: PHYSICIAN ASSISTANT

## 2022-05-02 RX ORDER — TRAZODONE HYDROCHLORIDE 100 MG/1
100 TABLET ORAL NIGHTLY
COMMUNITY
Start: 2022-04-07

## 2022-05-02 RX ORDER — MORPHINE SULFATE 15 MG/1
15 TABLET, FILM COATED, EXTENDED RELEASE ORAL 2 TIMES DAILY
COMMUNITY
Start: 2022-04-11

## 2022-05-03 NOTE — PROGRESS NOTES
Subjective   Andres Murphy is a 47 y.o. male is here today for follow-up.    History of Present Illness     The following portions of the patient's history were reviewed and updated as appropriate: allergies, current medications, past family history, past medical history, past social history, past surgical history and problem list.    Review of Systems:  A review of systems was performed, and positive findings are noted in the HPI.      Current Outpatient Medications:   •  albuterol sulfate  (90 Base) MCG/ACT inhaler, Inhale 1 puff Every 4 (Four) Hours As Needed., Disp: , Rfl:   •  Ascorbic Acid (VITAMIN C PO), Take  by mouth Daily., Disp: , Rfl:   •  baclofen (LIORESAL) 20 MG tablet, Take 20 mg by mouth 4 (Four) Times a Day. Alternating with Zanaflex, Disp: , Rfl:   •  Cholecalciferol (VITAMIN D3 PO), Take  by mouth Daily., Disp: , Rfl:   •  citalopram (CeleXA) 10 MG tablet, Take 5 mg by mouth Daily., Disp: , Rfl:   •  dicyclomine (BENTYL) 10 MG capsule, Take 10 mg by mouth 4 (Four) Times a Day Before Meals & at Bedtime., Disp: , Rfl:   •  diphenhydrAMINE (BENADRYL) 25 mg capsule, Take 25 mg by mouth At Night As Needed for Allergies or Sleep., Disp: , Rfl:   •  Ferrous Sulfate (IRON PO), Take  by mouth., Disp: , Rfl:   •  HYDROcodone-acetaminophen (NORCO)  MG per tablet, Take 1 tablet by mouth 4 (Four) Times a Day., Disp: , Rfl:   •  hydrOXYzine (ATARAX) 25 MG tablet, Take 25 mg by mouth 2 (two) times a day., Disp: , Rfl:   •  Kesimpta 20 MG/0.4ML solution auto-injector, Inject 20 mg under the skin into the appropriate area as directed Every 30 (Thirty) Days., Disp: 1 pen, Rfl: 1  •  lisinopril (PRINIVIL,ZESTRIL) 10 MG tablet, Take 10 mg by mouth Daily., Disp: , Rfl:   •  MAGNESIUM PO, Take  by mouth., Disp: , Rfl:   •  Melatonin 10 MG tablet, Take 20 mg by mouth At Night As Needed. Takes 2 10mg tablets, Disp: , Rfl:   •  meloxicam (MOBIC) 15 MG tablet, Take 15 mg by mouth Daily., Disp: , Rfl:    •  Morphine (MS CONTIN) 15 MG 12 hr tablet, Take 15 mg by mouth 2 (Two) Times a Day., Disp: , Rfl:   •  pantoprazole (PROTONIX) 40 MG EC tablet, Take 40 mg by mouth Daily., Disp: , Rfl:   •  Symbicort 80-4.5 MCG/ACT inhaler, Inhale 2 puffs 2 (Two) Times a Day., Disp: , Rfl:   •  tadalafil (CIALIS) 5 MG tablet, Take 5 mg by mouth Daily As Needed for Erectile Dysfunction., Disp: , Rfl:   •  tiZANidine (ZANAFLEX) 4 MG tablet, Take 4 mg by mouth 4 (Four) Times a Day. Alternating with baclofen., Disp: , Rfl:   •  traZODone (DESYREL) 100 MG tablet, Take 100 mg by mouth Every Night., Disp: , Rfl:   •  valACYclovir HCl (VALTREX PO), Take  by mouth Daily As Needed., Disp: , Rfl:   •  VITAMIN A PO, Take  by mouth., Disp: , Rfl:   •  VITAMIN D PO, Take  by mouth Daily., Disp: , Rfl:      Objective   Physical Exam  Vitals and nursing note reviewed.   HENT:      Head: Normocephalic.      Right Ear: Hearing and external ear normal.      Left Ear: Hearing and external ear normal.      Nose: Nose normal.      Mouth/Throat:      Pharynx: Oropharynx is clear.   Eyes:      General: Lids are normal. Vision grossly intact. Gaze aligned appropriately. No scleral icterus.     Extraocular Movements: Extraocular movements intact.      Conjunctiva/sclera: Conjunctivae normal.      Pupils: Pupils are equal, round, and reactive to light.      Visual Fields: Right eye visual fields normal and left eye visual fields normal.   Neck:      Vascular: No carotid bruit or JVD.      Trachea: Trachea and phonation normal.   Cardiovascular:      Rate and Rhythm: Normal rate.      Heart sounds: Normal heart sounds.   Pulmonary:      Effort: Pulmonary effort is normal.      Breath sounds: Normal breath sounds.   Musculoskeletal:      Cervical back: Normal range of motion.      Comments: Continues to have gait and hip issues with a known history of avascular necrosis of the hip.    Skin:     General: Skin is warm and dry.   Neurological:      Mental  Status: He is alert and oriented to person, place, and time.      GCS: GCS eye subscore is 4. GCS verbal subscore is 5. GCS motor subscore is 6.      Cranial Nerves: Cranial nerves are intact.      Sensory: Sensory deficit present.      Motor: Weakness, tremor and abnormal muscle tone present.      Coordination: Coordination is intact.      Gait: Gait abnormal.      Deep Tendon Reflexes: Reflexes abnormal.      Comments: Deep tendon reflexes are increased and abnormal.     Neurologic comments: left-sided sensory deficits to light touch, upper and lower extremity greater than the left than the right, increased the reflexes on the left.    Psychiatric:         Attention and Perception: Attention and perception normal.         Mood and Affect: Affect normal. Mood is anxious.         Speech: Speech normal.         Behavior: Behavior normal.         Thought Content: Thought content normal.         Cognition and Memory: Cognition and memory normal.         Judgment: Judgment normal.           Assessment/Plan   Diagnoses and all orders for this visit:    1. MS (multiple sclerosis) (HCC) (Primary)  -     MRI Brain With & Without Contrast; Future  -     MRI Cervical Spine With & Without Contrast; Future    2. Quadriparesis (HCC)  -     MRI Brain With & Without Contrast; Future  -     MRI Cervical Spine With & Without Contrast; Future      Patient will continue Kesimpta 20 mg monthly. Recent labs were reviewed with the patient. Most recent MRI results also reviewed with the patient. Ongoing monitoring including questions about PML and risk of other adverse events were discussed thoroughly with the patient. Spastic quadriparesis unchanged. The plan will be to order a  follow-up MRI of the brain and cervical spine in 10/2022 of this year with clinical follow-up at that time. Continue Kesimpta in the interim. Patient has had some issues with upper respiratory infections surrounding Kesimpta, which he feels might be potentially  related. We looked at his immunoglobulins, which have been normal. The patient has multiple demands with regard to disease modifying therapy, which limits our options. I feel the best option is to continue with since there has been both clinical and radiographic improvement in his overall situation. This is discussed thoroughly with the patient today.           Transcribed from ambient dictation for TAMEKA Alvarez by Malou Myrick.  05/02/22   19:08 CDT    Patient verbalized consent to the visit recording.

## 2022-07-08 DIAGNOSIS — G35 MS (MULTIPLE SCLEROSIS): ICD-10-CM

## 2022-07-08 RX ORDER — OFATUMUMAB 20 MG/.4ML
20 INJECTION, SOLUTION SUBCUTANEOUS
Qty: 1 PEN | Refills: 5 | Status: SHIPPED | OUTPATIENT
Start: 2022-07-08 | End: 2023-01-25

## 2022-09-24 ENCOUNTER — PATIENT MESSAGE (OUTPATIENT)
Dept: NEUROLOGY | Facility: CLINIC | Age: 47
End: 2022-09-24

## 2022-09-26 NOTE — TELEPHONE ENCOUNTER
Explained that Jasvir ordered MRI of cervical spine with and without, MRI of brain with and without.  The orders are in the chart, the tests are due next month.

## 2022-10-24 ENCOUNTER — OFFICE VISIT (OUTPATIENT)
Dept: NEUROLOGY | Facility: CLINIC | Age: 47
End: 2022-10-24

## 2022-10-24 VITALS
BODY MASS INDEX: 37.24 KG/M2 | DIASTOLIC BLOOD PRESSURE: 80 MMHG | WEIGHT: 266 LBS | SYSTOLIC BLOOD PRESSURE: 130 MMHG | HEART RATE: 95 BPM | OXYGEN SATURATION: 100 % | HEIGHT: 71 IN

## 2022-10-24 DIAGNOSIS — G35 MS (MULTIPLE SCLEROSIS): Primary | ICD-10-CM

## 2022-10-24 DIAGNOSIS — M87.051 AVASCULAR NECROSIS OF BONE OF RIGHT HIP: ICD-10-CM

## 2022-10-24 DIAGNOSIS — G82.50 QUADRIPARESIS: ICD-10-CM

## 2022-10-24 PROCEDURE — 99214 OFFICE O/P EST MOD 30 MIN: CPT | Performed by: PHYSICIAN ASSISTANT

## 2022-10-24 RX ORDER — CITALOPRAM 20 MG/1
20 TABLET ORAL DAILY
COMMUNITY
Start: 2022-09-29

## 2022-10-31 ENCOUNTER — APPOINTMENT (OUTPATIENT)
Dept: MRI IMAGING | Facility: HOSPITAL | Age: 47
End: 2022-10-31

## 2023-01-11 ENCOUNTER — PATIENT MESSAGE (OUTPATIENT)
Dept: NEUROLOGY | Facility: CLINIC | Age: 48
End: 2023-01-11
Payer: COMMERCIAL

## 2023-01-23 ENCOUNTER — HOSPITAL ENCOUNTER (OUTPATIENT)
Dept: MRI IMAGING | Facility: HOSPITAL | Age: 48
Discharge: HOME | End: 2023-01-23
Payer: COMMERCIAL

## 2023-01-23 DIAGNOSIS — G82.50 QUADRIPARESIS: ICD-10-CM

## 2023-01-23 DIAGNOSIS — G35 MS (MULTIPLE SCLEROSIS): ICD-10-CM

## 2023-01-23 LAB — CREAT BLDA-MCNC: 0.9 MG/DL (ref 0.6–1.3)

## 2023-01-23 PROCEDURE — A9577 INJ MULTIHANCE: HCPCS | Performed by: PHYSICIAN ASSISTANT

## 2023-01-23 PROCEDURE — 0 GADOBENATE DIMEGLUMINE 529 MG/ML SOLUTION: Performed by: PHYSICIAN ASSISTANT

## 2023-01-23 PROCEDURE — 72156 MRI NECK SPINE W/O & W/DYE: CPT

## 2023-01-23 PROCEDURE — 82565 ASSAY OF CREATININE: CPT

## 2023-01-23 PROCEDURE — 70553 MRI BRAIN STEM W/O & W/DYE: CPT

## 2023-01-23 RX ADMIN — GADOBENATE DIMEGLUMINE 20 ML: 529 INJECTION, SOLUTION INTRAVENOUS at 11:53

## 2023-01-25 DIAGNOSIS — G35 MS (MULTIPLE SCLEROSIS): ICD-10-CM

## 2023-01-25 RX ORDER — OFATUMUMAB 20 MG/.4ML
20 INJECTION, SOLUTION SUBCUTANEOUS
Qty: 0.4 ML | Refills: 5 | Status: SHIPPED | OUTPATIENT
Start: 2023-01-25

## 2023-02-08 DIAGNOSIS — G35 MS (MULTIPLE SCLEROSIS): ICD-10-CM

## 2023-02-09 RX ORDER — OFATUMUMAB 20 MG/.4ML
INJECTION, SOLUTION SUBCUTANEOUS
Refills: 5 | OUTPATIENT
Start: 2023-02-09

## 2023-03-10 DIAGNOSIS — G35 MS (MULTIPLE SCLEROSIS): ICD-10-CM

## 2023-03-10 RX ORDER — OFATUMUMAB 20 MG/.4ML
INJECTION, SOLUTION SUBCUTANEOUS
Refills: 5 | OUTPATIENT
Start: 2023-03-10

## 2023-05-12 ENCOUNTER — TELEPHONE (OUTPATIENT)
Dept: NEUROLOGY | Facility: CLINIC | Age: 48
End: 2023-05-12
Payer: COMMERCIAL

## 2023-07-16 ENCOUNTER — HOSPITAL ENCOUNTER (INPATIENT)
Facility: HOSPITAL | Age: 48
LOS: 1 days | Discharge: HOME | DRG: 439 | End: 2023-07-17
Attending: EMERGENCY MEDICINE | Admitting: STUDENT IN AN ORGANIZED HEALTH CARE EDUCATION/TRAINING PROGRAM
Payer: COMMERCIAL

## 2023-07-16 ENCOUNTER — APPOINTMENT (OUTPATIENT)
Dept: CT IMAGING | Facility: HOSPITAL | Age: 48
DRG: 439 | End: 2023-07-16
Payer: COMMERCIAL

## 2023-07-16 DIAGNOSIS — K85.90 ACUTE PANCREATITIS, UNSPECIFIED COMPLICATION STATUS, UNSPECIFIED PANCREATITIS TYPE: Primary | ICD-10-CM

## 2023-07-16 PROBLEM — J44.9 COPD NOT AFFECTING CURRENT EPISODE OF CARE: Status: ACTIVE | Noted: 2023-07-16

## 2023-07-16 PROBLEM — N17.9 AKI (ACUTE KIDNEY INJURY): Status: ACTIVE | Noted: 2023-07-16

## 2023-07-16 PROBLEM — F17.290 NICOTINE DEPENDENCE DUE TO VAPING TOBACCO PRODUCT: Status: ACTIVE | Noted: 2023-07-16

## 2023-07-16 PROBLEM — IMO0001 COPD NOT AFFECTING CURRENT EPISODE OF CARE: Status: ACTIVE | Noted: 2023-07-16

## 2023-07-16 LAB
ALBUMIN SERPL-MCNC: 4.2 G/DL (ref 3.5–5.2)
ALBUMIN/GLOB SERPL: 1.2 G/DL
ALP SERPL-CCNC: 73 U/L (ref 39–117)
ALT SERPL W P-5'-P-CCNC: 17 U/L (ref 1–41)
AMPHET+METHAMPHET UR QL: NEGATIVE
AMPHETAMINES UR QL: NEGATIVE
ANION GAP SERPL CALCULATED.3IONS-SCNC: 9 MMOL/L (ref 5–15)
AST SERPL-CCNC: 15 U/L (ref 1–40)
BACTERIA UR QL AUTO: ABNORMAL /HPF
BARBITURATES UR QL SCN: NEGATIVE
BASOPHILS # BLD AUTO: 0.07 10*3/MM3 (ref 0–0.2)
BASOPHILS NFR BLD AUTO: 0.6 % (ref 0–1.5)
BENZODIAZ UR QL SCN: POSITIVE
BILIRUB SERPL-MCNC: 0.3 MG/DL (ref 0–1.2)
BILIRUB UR QL STRIP: NEGATIVE
BUN SERPL-MCNC: 23 MG/DL (ref 6–20)
BUN/CREAT SERPL: 17.7 (ref 7–25)
BUPRENORPHINE SERPL-MCNC: NEGATIVE NG/ML
CALCIUM SPEC-SCNC: 9.9 MG/DL (ref 8.6–10.5)
CANNABINOIDS SERPL QL: NEGATIVE
CHLORIDE SERPL-SCNC: 102 MMOL/L (ref 98–107)
CHOLEST SERPL-MCNC: 191 MG/DL (ref 0–200)
CLARITY UR: CLEAR
CO2 SERPL-SCNC: 28 MMOL/L (ref 22–29)
COCAINE UR QL: NEGATIVE
COLOR UR: YELLOW
CREAT SERPL-MCNC: 1.3 MG/DL (ref 0.76–1.27)
DEPRECATED RDW RBC AUTO: 46.6 FL (ref 37–54)
EGFRCR SERPLBLD CKD-EPI 2021: 67.8 ML/MIN/1.73
EOSINOPHIL # BLD AUTO: 0.29 10*3/MM3 (ref 0–0.4)
EOSINOPHIL NFR BLD AUTO: 2.4 % (ref 0.3–6.2)
ERYTHROCYTE [DISTWIDTH] IN BLOOD BY AUTOMATED COUNT: 12.9 % (ref 12.3–15.4)
ETHANOL BLD-MCNC: <10 MG/DL (ref 0–10)
ETHANOL UR QL: <0.01 %
FENTANYL UR-MCNC: NEGATIVE NG/ML
GLOBULIN UR ELPH-MCNC: 3.6 GM/DL
GLUCOSE SERPL-MCNC: 133 MG/DL (ref 65–99)
GLUCOSE UR STRIP-MCNC: NEGATIVE MG/DL
HCT VFR BLD AUTO: 38.9 % (ref 37.5–51)
HDLC SERPL-MCNC: 70 MG/DL (ref 40–60)
HGB BLD-MCNC: 13.4 G/DL (ref 13–17.7)
HGB UR QL STRIP.AUTO: NEGATIVE
HOLD SPECIMEN: NORMAL
HOLD SPECIMEN: NORMAL
HYALINE CASTS UR QL AUTO: ABNORMAL /LPF
IMM GRANULOCYTES # BLD AUTO: 0.08 10*3/MM3 (ref 0–0.05)
IMM GRANULOCYTES NFR BLD AUTO: 0.7 % (ref 0–0.5)
KETONES UR QL STRIP: NEGATIVE
LDLC SERPL CALC-MCNC: 102 MG/DL (ref 0–100)
LDLC/HDLC SERPL: 1.42 {RATIO}
LEUKOCYTE ESTERASE UR QL STRIP.AUTO: NEGATIVE
LIPASE SERPL-CCNC: 188 U/L (ref 13–60)
LYMPHOCYTES # BLD AUTO: 1.21 10*3/MM3 (ref 0.7–3.1)
LYMPHOCYTES NFR BLD AUTO: 10.1 % (ref 19.6–45.3)
MAGNESIUM SERPL-MCNC: 2.1 MG/DL (ref 1.6–2.6)
MCH RBC QN AUTO: 33.8 PG (ref 26.6–33)
MCHC RBC AUTO-ENTMCNC: 34.4 G/DL (ref 31.5–35.7)
MCV RBC AUTO: 98.2 FL (ref 79–97)
METHADONE UR QL SCN: NEGATIVE
MONOCYTES # BLD AUTO: 0.93 10*3/MM3 (ref 0.1–0.9)
MONOCYTES NFR BLD AUTO: 7.7 % (ref 5–12)
NEUTROPHILS NFR BLD AUTO: 78.5 % (ref 42.7–76)
NEUTROPHILS NFR BLD AUTO: 9.44 10*3/MM3 (ref 1.7–7)
NITRITE UR QL STRIP: NEGATIVE
NRBC BLD AUTO-RTO: 0 /100 WBC (ref 0–0.2)
OPIATES UR QL: POSITIVE
OXYCODONE UR QL SCN: NEGATIVE
PCP UR QL SCN: NEGATIVE
PH UR STRIP.AUTO: <=5 [PH] (ref 5–9)
PLATELET # BLD AUTO: 190 10*3/MM3 (ref 140–450)
PMV BLD AUTO: 9.6 FL (ref 6–12)
POTASSIUM SERPL-SCNC: 5.2 MMOL/L (ref 3.5–5.2)
PROPOXYPH UR QL: NEGATIVE
PROT SERPL-MCNC: 7.8 G/DL (ref 6–8.5)
PROT UR QL STRIP: ABNORMAL
RBC # BLD AUTO: 3.96 10*6/MM3 (ref 4.14–5.8)
RBC # UR STRIP: ABNORMAL /HPF
REF LAB TEST METHOD: ABNORMAL
SODIUM SERPL-SCNC: 139 MMOL/L (ref 136–145)
SP GR UR STRIP: 1.06 (ref 1–1.03)
SQUAMOUS #/AREA URNS HPF: ABNORMAL /HPF
TRICYCLICS UR QL SCN: NEGATIVE
TRIGL SERPL-MCNC: 108 MG/DL (ref 0–150)
UROBILINOGEN UR QL STRIP: ABNORMAL
VLDLC SERPL-MCNC: 19 MG/DL (ref 5–40)
WBC # UR STRIP: ABNORMAL /HPF
WBC NRBC COR # BLD: 12.02 10*3/MM3 (ref 3.4–10.8)
WHOLE BLOOD HOLD COAG: NORMAL
WHOLE BLOOD HOLD SPECIMEN: NORMAL

## 2023-07-16 PROCEDURE — 25010000002 HYDROMORPHONE 1 MG/ML SOLUTION: Performed by: EMERGENCY MEDICINE

## 2023-07-16 PROCEDURE — 25010000002 ONDANSETRON PER 1 MG: Performed by: EMERGENCY MEDICINE

## 2023-07-16 PROCEDURE — 25010000002 ENOXAPARIN PER 10 MG: Performed by: STUDENT IN AN ORGANIZED HEALTH CARE EDUCATION/TRAINING PROGRAM

## 2023-07-16 PROCEDURE — 25010000002 MORPHINE PER 10 MG: Performed by: STUDENT IN AN ORGANIZED HEALTH CARE EDUCATION/TRAINING PROGRAM

## 2023-07-16 PROCEDURE — 85025 COMPLETE CBC W/AUTO DIFF WBC: CPT | Performed by: EMERGENCY MEDICINE

## 2023-07-16 PROCEDURE — 83690 ASSAY OF LIPASE: CPT | Performed by: EMERGENCY MEDICINE

## 2023-07-16 PROCEDURE — 80307 DRUG TEST PRSMV CHEM ANLYZR: CPT | Performed by: STUDENT IN AN ORGANIZED HEALTH CARE EDUCATION/TRAINING PROGRAM

## 2023-07-16 PROCEDURE — 80061 LIPID PANEL: CPT | Performed by: STUDENT IN AN ORGANIZED HEALTH CARE EDUCATION/TRAINING PROGRAM

## 2023-07-16 PROCEDURE — 82077 ASSAY SPEC XCP UR&BREATH IA: CPT | Performed by: STUDENT IN AN ORGANIZED HEALTH CARE EDUCATION/TRAINING PROGRAM

## 2023-07-16 PROCEDURE — 99284 EMERGENCY DEPT VISIT MOD MDM: CPT

## 2023-07-16 PROCEDURE — 80053 COMPREHEN METABOLIC PANEL: CPT | Performed by: EMERGENCY MEDICINE

## 2023-07-16 PROCEDURE — 83735 ASSAY OF MAGNESIUM: CPT | Performed by: STUDENT IN AN ORGANIZED HEALTH CARE EDUCATION/TRAINING PROGRAM

## 2023-07-16 PROCEDURE — 25010000002 KETOROLAC TROMETHAMINE PER 15 MG: Performed by: EMERGENCY MEDICINE

## 2023-07-16 PROCEDURE — 25510000001 IOPAMIDOL 61 % SOLUTION: Performed by: EMERGENCY MEDICINE

## 2023-07-16 PROCEDURE — 81001 URINALYSIS AUTO W/SCOPE: CPT | Performed by: STUDENT IN AN ORGANIZED HEALTH CARE EDUCATION/TRAINING PROGRAM

## 2023-07-16 PROCEDURE — 99222 1ST HOSP IP/OBS MODERATE 55: CPT | Performed by: STUDENT IN AN ORGANIZED HEALTH CARE EDUCATION/TRAINING PROGRAM

## 2023-07-16 PROCEDURE — 74177 CT ABD & PELVIS W/CONTRAST: CPT

## 2023-07-16 RX ORDER — CITALOPRAM 20 MG/1
20 TABLET ORAL DAILY
Status: DISCONTINUED | OUTPATIENT
Start: 2023-07-17 | End: 2023-07-17 | Stop reason: HOSPADM

## 2023-07-16 RX ORDER — SODIUM CHLORIDE 9 MG/ML
150 INJECTION, SOLUTION INTRAVENOUS CONTINUOUS
Status: DISCONTINUED | OUTPATIENT
Start: 2023-07-16 | End: 2023-07-17 | Stop reason: HOSPADM

## 2023-07-16 RX ORDER — HYDROCODONE BITARTRATE AND ACETAMINOPHEN 10; 325 MG/1; MG/1
1 TABLET ORAL 4 TIMES DAILY
Status: DISCONTINUED | OUTPATIENT
Start: 2023-07-16 | End: 2023-07-16

## 2023-07-16 RX ORDER — PANTOPRAZOLE SODIUM 40 MG/1
40 TABLET, DELAYED RELEASE ORAL DAILY
Status: DISCONTINUED | OUTPATIENT
Start: 2023-07-17 | End: 2023-07-17 | Stop reason: HOSPADM

## 2023-07-16 RX ORDER — AMOXICILLIN 250 MG
2 CAPSULE ORAL 2 TIMES DAILY
Status: DISCONTINUED | OUTPATIENT
Start: 2023-07-16 | End: 2023-07-17 | Stop reason: HOSPADM

## 2023-07-16 RX ORDER — ALBUTEROL SULFATE 90 UG/1
1 AEROSOL, METERED RESPIRATORY (INHALATION) EVERY 4 HOURS PRN
Status: DISCONTINUED | OUTPATIENT
Start: 2023-07-16 | End: 2023-07-16 | Stop reason: CLARIF

## 2023-07-16 RX ORDER — ONDANSETRON 4 MG/1
4 TABLET, FILM COATED ORAL EVERY 6 HOURS PRN
Status: DISCONTINUED | OUTPATIENT
Start: 2023-07-16 | End: 2023-07-17 | Stop reason: HOSPADM

## 2023-07-16 RX ORDER — SODIUM CHLORIDE 0.9 % (FLUSH) 0.9 %
10 SYRINGE (ML) INJECTION AS NEEDED
Status: DISCONTINUED | OUTPATIENT
Start: 2023-07-16 | End: 2023-07-17 | Stop reason: HOSPADM

## 2023-07-16 RX ORDER — NICOTINE 21 MG/24HR
1 PATCH, TRANSDERMAL 24 HOURS TRANSDERMAL EVERY 24 HOURS
Status: DISCONTINUED | OUTPATIENT
Start: 2023-07-16 | End: 2023-07-17 | Stop reason: HOSPADM

## 2023-07-16 RX ORDER — KETOROLAC TROMETHAMINE 15 MG/ML
15 INJECTION, SOLUTION INTRAMUSCULAR; INTRAVENOUS ONCE
Status: COMPLETED | OUTPATIENT
Start: 2023-07-16 | End: 2023-07-16

## 2023-07-16 RX ORDER — TRAZODONE HYDROCHLORIDE 100 MG/1
100 TABLET ORAL NIGHTLY
Status: DISCONTINUED | OUTPATIENT
Start: 2023-07-17 | End: 2023-07-17 | Stop reason: HOSPADM

## 2023-07-16 RX ORDER — MORPHINE SULFATE 2 MG/ML
2 INJECTION, SOLUTION INTRAMUSCULAR; INTRAVENOUS EVERY 4 HOURS PRN
Status: DISCONTINUED | OUTPATIENT
Start: 2023-07-16 | End: 2023-07-17 | Stop reason: HOSPADM

## 2023-07-16 RX ORDER — HYDROXYZINE HYDROCHLORIDE 25 MG/1
25 TABLET, FILM COATED ORAL 2 TIMES DAILY
Status: DISCONTINUED | OUTPATIENT
Start: 2023-07-17 | End: 2023-07-17 | Stop reason: HOSPADM

## 2023-07-16 RX ORDER — POLYETHYLENE GLYCOL 3350 17 G/17G
17 POWDER, FOR SOLUTION ORAL DAILY PRN
Status: DISCONTINUED | OUTPATIENT
Start: 2023-07-16 | End: 2023-07-17 | Stop reason: HOSPADM

## 2023-07-16 RX ORDER — FERROUS SULFATE TAB EC 324 MG (65 MG FE EQUIVALENT) 324 (65 FE) MG
324 TABLET DELAYED RESPONSE ORAL
Status: DISCONTINUED | OUTPATIENT
Start: 2023-07-17 | End: 2023-07-17 | Stop reason: HOSPADM

## 2023-07-16 RX ORDER — MORPHINE SULFATE 15 MG/1
15 TABLET, FILM COATED, EXTENDED RELEASE ORAL 2 TIMES DAILY
Status: DISCONTINUED | OUTPATIENT
Start: 2023-07-17 | End: 2023-07-17

## 2023-07-16 RX ORDER — ENOXAPARIN SODIUM 100 MG/ML
40 INJECTION SUBCUTANEOUS DAILY
Status: DISCONTINUED | OUTPATIENT
Start: 2023-07-16 | End: 2023-07-17 | Stop reason: HOSPADM

## 2023-07-16 RX ORDER — DICYCLOMINE HYDROCHLORIDE 10 MG/1
10 CAPSULE ORAL 4 TIMES DAILY
Status: DISCONTINUED | OUTPATIENT
Start: 2023-07-17 | End: 2023-07-17 | Stop reason: HOSPADM

## 2023-07-16 RX ORDER — NALOXONE HCL 0.4 MG/ML
0.4 VIAL (ML) INJECTION
Status: DISCONTINUED | OUTPATIENT
Start: 2023-07-16 | End: 2023-07-17 | Stop reason: HOSPADM

## 2023-07-16 RX ORDER — BISACODYL 5 MG/1
5 TABLET, DELAYED RELEASE ORAL DAILY PRN
Status: DISCONTINUED | OUTPATIENT
Start: 2023-07-16 | End: 2023-07-17 | Stop reason: HOSPADM

## 2023-07-16 RX ORDER — TIZANIDINE 4 MG/1
4 TABLET ORAL 4 TIMES DAILY
Status: DISCONTINUED | OUTPATIENT
Start: 2023-07-16 | End: 2023-07-17 | Stop reason: HOSPADM

## 2023-07-16 RX ORDER — HYDROCODONE BITARTRATE AND ACETAMINOPHEN 10; 325 MG/1; MG/1
1 TABLET ORAL 4 TIMES DAILY
Status: DISCONTINUED | OUTPATIENT
Start: 2023-07-17 | End: 2023-07-17 | Stop reason: HOSPADM

## 2023-07-16 RX ORDER — TRAZODONE HYDROCHLORIDE 100 MG/1
100 TABLET ORAL NIGHTLY
Status: DISCONTINUED | OUTPATIENT
Start: 2023-07-16 | End: 2023-07-16

## 2023-07-16 RX ORDER — SODIUM CHLORIDE 9 MG/ML
40 INJECTION, SOLUTION INTRAVENOUS AS NEEDED
Status: DISCONTINUED | OUTPATIENT
Start: 2023-07-16 | End: 2023-07-17 | Stop reason: HOSPADM

## 2023-07-16 RX ORDER — BISACODYL 10 MG
10 SUPPOSITORY, RECTAL RECTAL DAILY PRN
Status: DISCONTINUED | OUTPATIENT
Start: 2023-07-16 | End: 2023-07-17 | Stop reason: HOSPADM

## 2023-07-16 RX ORDER — SODIUM CHLORIDE 0.9 % (FLUSH) 0.9 %
10 SYRINGE (ML) INJECTION EVERY 12 HOURS SCHEDULED
Status: DISCONTINUED | OUTPATIENT
Start: 2023-07-16 | End: 2023-07-17 | Stop reason: HOSPADM

## 2023-07-16 RX ORDER — MORPHINE SULFATE 2 MG/ML
1 INJECTION, SOLUTION INTRAMUSCULAR; INTRAVENOUS EVERY 4 HOURS PRN
Status: DISCONTINUED | OUTPATIENT
Start: 2023-07-16 | End: 2023-07-16

## 2023-07-16 RX ORDER — ONDANSETRON 2 MG/ML
4 INJECTION INTRAMUSCULAR; INTRAVENOUS ONCE
Status: COMPLETED | OUTPATIENT
Start: 2023-07-16 | End: 2023-07-16

## 2023-07-16 RX ORDER — ONDANSETRON 2 MG/ML
4 INJECTION INTRAMUSCULAR; INTRAVENOUS EVERY 6 HOURS PRN
Status: DISCONTINUED | OUTPATIENT
Start: 2023-07-16 | End: 2023-07-17 | Stop reason: HOSPADM

## 2023-07-16 RX ORDER — DIPHENHYDRAMINE HCL 25 MG
25 CAPSULE ORAL NIGHTLY PRN
Status: DISCONTINUED | OUTPATIENT
Start: 2023-07-16 | End: 2023-07-17 | Stop reason: HOSPADM

## 2023-07-16 RX ORDER — MORPHINE SULFATE 15 MG/1
15 TABLET, FILM COATED, EXTENDED RELEASE ORAL 2 TIMES DAILY
Status: DISCONTINUED | OUTPATIENT
Start: 2023-07-16 | End: 2023-07-16

## 2023-07-16 RX ORDER — VALACYCLOVIR HYDROCHLORIDE 500 MG/1
500 TABLET, FILM COATED ORAL EVERY 12 HOURS SCHEDULED
Status: DISCONTINUED | OUTPATIENT
Start: 2023-07-17 | End: 2023-07-17 | Stop reason: HOSPADM

## 2023-07-16 RX ORDER — NALOXONE HCL 0.4 MG/ML
0.4 VIAL (ML) INJECTION
Status: DISCONTINUED | OUTPATIENT
Start: 2023-07-16 | End: 2023-07-16

## 2023-07-16 RX ORDER — ALBUTEROL SULFATE 2.5 MG/3ML
2.5 SOLUTION RESPIRATORY (INHALATION) EVERY 4 HOURS PRN
Status: DISCONTINUED | OUTPATIENT
Start: 2023-07-16 | End: 2023-07-17 | Stop reason: HOSPADM

## 2023-07-16 RX ADMIN — SODIUM CHLORIDE 125 ML/HR: 9 INJECTION, SOLUTION INTRAVENOUS at 17:23

## 2023-07-16 RX ADMIN — SODIUM CHLORIDE 150 ML/HR: 9 INJECTION, SOLUTION INTRAVENOUS at 22:10

## 2023-07-16 RX ADMIN — ONDANSETRON 4 MG: 2 INJECTION INTRAMUSCULAR; INTRAVENOUS at 17:24

## 2023-07-16 RX ADMIN — HYDROMORPHONE HYDROCHLORIDE 0.5 MG: 1 INJECTION, SOLUTION INTRAMUSCULAR; INTRAVENOUS; SUBCUTANEOUS at 17:47

## 2023-07-16 RX ADMIN — KETOROLAC TROMETHAMINE 15 MG: 15 INJECTION, SOLUTION INTRAMUSCULAR; INTRAVENOUS at 17:24

## 2023-07-16 RX ADMIN — ENOXAPARIN SODIUM 40 MG: 40 INJECTION SUBCUTANEOUS at 22:10

## 2023-07-16 RX ADMIN — IOPAMIDOL 90 ML: 612 INJECTION, SOLUTION INTRAVENOUS at 17:38

## 2023-07-16 RX ADMIN — Medication 1 PATCH: at 22:12

## 2023-07-16 RX ADMIN — MORPHINE SULFATE 1 MG: 2 INJECTION, SOLUTION INTRAMUSCULAR; INTRAVENOUS at 21:23

## 2023-07-16 NOTE — ED PROVIDER NOTES
Subjective   History of Present Illness  Patient presents emergency department complaint of left lower quadrant abdominal pain.  Patient has prior history of polyps as well as diverticulosis and prior history of diverticulitis as well.  Patient states he is seen Dr. Andrade in the past and has had his polyps removed.  Patient notes about 1 week of symptoms left lower quadrant pain with nausea.  Patient notes early satiety as well.  There is been no fevers there is been no diarrhea.  Patient has been ambulatory but has had decreased appetite with the symptoms.    History provided by:  Patient    Review of Systems   Constitutional:  Positive for activity change, appetite change and fatigue. Negative for chills and unexpected weight change.   Respiratory:  Negative for cough and shortness of breath.    Gastrointestinal:  Positive for nausea and vomiting. Negative for abdominal pain and diarrhea.   Genitourinary:  Negative for dysuria.   Musculoskeletal:  Negative for back pain.   Skin:  Negative for pallor.   Neurological:  Negative for headaches.   Psychiatric/Behavioral:  Negative for confusion and decreased concentration.    All other systems reviewed and are negative.    Past Medical History:   Diagnosis Date    Avascular necrosis of hip, right     Degenerative disc disease, cervical     Encounter for routine adult health examination     IBS (irritable bowel syndrome)     MS (multiple sclerosis)        Allergies   Allergen Reactions    Sulfa Antibiotics Anaphylaxis    Phenergan [Promethazine Hcl] Unknown - Low Severity       Past Surgical History:   Procedure Laterality Date    CARPAL TUNNEL RELEASE      COLONOSCOPY N/A 6/3/2020    Procedure: COLONOSCOPY;  Surgeon: Temo Andrade DO;  Location: [facility] [place] ENDOSCOPY;  Service: Gastroenterology;  Laterality: N/A;    ENDOSCOPY N/A 6/3/2020    Procedure: ESOPHAGOGASTRODUODENOSCOPY;  Surgeon: Temo Andrade DO;  Location: [facility] [place] ENDOSCOPY;  Service: Gastroenterology;   Laterality: N/A;    VASECTOMY         Family History   Problem Relation Age of Onset    Leukemia Mother     Cancer Other     Colon cancer Other     Depression Other     Diabetes Other     Osteoarthritis Other     Stroke Other     Thyroid disease Other     Diabetes Father     Colon cancer Father     Glaucoma Father        Social History     Socioeconomic History    Marital status:    Tobacco Use    Smoking status: Former     Years: 15.00     Types: Cigarettes    Smokeless tobacco: Never   Substance and Sexual Activity    Alcohol use: No    Drug use: Never    Sexual activity: Defer           Objective   Physical Exam  Vitals and nursing note reviewed.   Constitutional:       General: He is not in acute distress.     Appearance: He is well-developed. He is ill-appearing. He is not diaphoretic.   HENT:      Head: Normocephalic and atraumatic.   Eyes:      Conjunctiva/sclera: Conjunctivae normal.   Neck:      Vascular: No JVD.   Cardiovascular:      Rate and Rhythm: Normal rate and regular rhythm.      Heart sounds: Normal heart sounds. No murmur heard.    No friction rub. No gallop.   Pulmonary:      Effort: Pulmonary effort is normal. No respiratory distress.      Breath sounds: No wheezing or rales.   Chest:      Chest wall: No tenderness.   Abdominal:      General: Bowel sounds are normal. There is no distension.      Palpations: Abdomen is soft. There is no mass.      Tenderness: There is abdominal tenderness in the left lower quadrant. There is no guarding or rebound.      Comments: No guarding, rigidity or rebound.   Musculoskeletal:         General: Normal range of motion.      Cervical back: Normal range of motion and neck supple.   Skin:     General: Skin is warm and dry.      Capillary Refill: Capillary refill takes less than 2 seconds.   Neurological:      General: No focal deficit present.      Mental Status: He is alert and oriented to person, place, and time.   Psychiatric:         Mood and Affect:  Mood normal.         Behavior: Behavior normal.         Thought Content: Thought content normal.         Judgment: Judgment normal.       Procedures           ED Course                                           Medical Decision Making  Signs symptoms consistent with pancreatitis.  This appears mild on CT scanning.  No obvious cholecystitis or cholelithiasis is noted.  Patient does drink alcohol though not a large amount per his account.  Patient will be admitted for further evaluation work-up.    Problems Addressed:  Acute pancreatitis, unspecified complication status, unspecified pancreatitis type: complicated acute illness or injury    Amount and/or Complexity of Data Reviewed  Labs: ordered.  Radiology: ordered.    Risk  Prescription drug management.  Decision regarding hospitalization.      Labs Reviewed   COMPREHENSIVE METABOLIC PANEL - Abnormal; Notable for the following components:       Result Value    Glucose 133 (*)     BUN 23 (*)     Creatinine 1.30 (*)     All other components within normal limits    Narrative:     GFR Normal >60  Chronic Kidney Disease <60  Kidney Failure <15     LIPASE - Abnormal; Notable for the following components:    Lipase 188 (*)     All other components within normal limits   CBC WITH AUTO DIFFERENTIAL - Abnormal; Notable for the following components:    WBC 12.02 (*)     RBC 3.96 (*)     MCV 98.2 (*)     MCH 33.8 (*)     Neutrophil % 78.5 (*)     Lymphocyte % 10.1 (*)     Immature Grans % 0.7 (*)     Neutrophils, Absolute 9.44 (*)     Monocytes, Absolute 0.93 (*)     Immature Grans, Absolute 0.08 (*)     All other components within normal limits   RAINBOW DRAW    Narrative:     The following orders were created for panel order [place] Draw.  Procedure                               Abnormality         Status                     ---------                               -----------         ------                     Green Top (Gel)[[id number]]                                  Final  result               Lavender Top[[id number]]                                     Final result               Gold Top - SST[[id number]]                                   Final result               Light Blue Top[[id number]]                                   Final result                 Please view results for these tests on the individual orders.   CBC AND DIFFERENTIAL    Narrative:     The following orders were created for panel order CBC & Differential.  Procedure                               Abnormality         Status                     ---------                               -----------         ------                     CBC Auto Differential[[id number]]        Abnormal            Final result                 Please view results for these tests on the individual orders.   GREEN TOP   LAVENDER TOP   GOLD TOP - SST   LIGHT BLUE TOP       CT Abdomen Pelvis With Contrast   Preliminary Result   Acute mild uncomplicated interstitial pancreatitis.  Hepatic steatosis.                  Final diagnoses:   Acute pancreatitis, unspecified complication status, unspecified pancreatitis type       ED Disposition  ED Disposition       ED Disposition   Decision to Admit    Condition   --    Comment   Level of Care: Med/Surg [1]   Diagnosis: Pancreatitis [202663]   Admitting Physician: BERNIE HOPSON [[id number]]   Attending Physician: BERNIE HOPSON [[id number]]   Certification: I Certify That Inpatient Hospital Services Are Medically Necessary For Greater Than 2 Midnights                 No follow-up provider specified.       Medication List      No changes were made to your prescriptions during this visit.            Reji Burnett MD  07/16/23 1954

## 2023-07-17 VITALS
TEMPERATURE: 96.9 F | SYSTOLIC BLOOD PRESSURE: 120 MMHG | BODY MASS INDEX: 37.57 KG/M2 | HEIGHT: 71 IN | HEART RATE: 64 BPM | OXYGEN SATURATION: 91 % | DIASTOLIC BLOOD PRESSURE: 55 MMHG | WEIGHT: 268.4 LBS | RESPIRATION RATE: 18 BRPM

## 2023-07-17 PROBLEM — F19.90 SUBSTANCE USE: Status: RESOLVED | Noted: 2023-07-17 | Resolved: 2023-07-17

## 2023-07-17 PROBLEM — F19.90 SUBSTANCE USE: Status: ACTIVE | Noted: 2023-07-17

## 2023-07-17 PROBLEM — N17.9 AKI (ACUTE KIDNEY INJURY): Status: RESOLVED | Noted: 2023-07-16 | Resolved: 2023-07-17

## 2023-07-17 PROBLEM — K85.90 PANCREATITIS: Status: RESOLVED | Noted: 2023-07-16 | Resolved: 2023-07-17

## 2023-07-17 LAB
ALBUMIN SERPL-MCNC: 3.8 G/DL (ref 3.5–5.2)
ALBUMIN/GLOB SERPL: 1.2 G/DL
ALP SERPL-CCNC: 60 U/L (ref 39–117)
ALT SERPL W P-5'-P-CCNC: 14 U/L (ref 1–41)
ANION GAP SERPL CALCULATED.3IONS-SCNC: 10 MMOL/L (ref 5–15)
AST SERPL-CCNC: 16 U/L (ref 1–40)
BASOPHILS # BLD AUTO: 0.09 10*3/MM3 (ref 0–0.2)
BASOPHILS NFR BLD AUTO: 0.9 % (ref 0–1.5)
BILIRUB SERPL-MCNC: 0.3 MG/DL (ref 0–1.2)
BUN SERPL-MCNC: 17 MG/DL (ref 6–20)
BUN/CREAT SERPL: 18.5 (ref 7–25)
CALCIUM SPEC-SCNC: 9 MG/DL (ref 8.6–10.5)
CHLORIDE SERPL-SCNC: 106 MMOL/L (ref 98–107)
CO2 SERPL-SCNC: 25 MMOL/L (ref 22–29)
CREAT SERPL-MCNC: 0.92 MG/DL (ref 0.76–1.27)
DEPRECATED RDW RBC AUTO: 45.2 FL (ref 37–54)
EGFRCR SERPLBLD CKD-EPI 2021: 102.6 ML/MIN/1.73
EOSINOPHIL # BLD AUTO: 0.36 10*3/MM3 (ref 0–0.4)
EOSINOPHIL NFR BLD AUTO: 3.8 % (ref 0.3–6.2)
ERYTHROCYTE [DISTWIDTH] IN BLOOD BY AUTOMATED COUNT: 12.8 % (ref 12.3–15.4)
GLOBULIN UR ELPH-MCNC: 3.2 GM/DL
GLUCOSE SERPL-MCNC: 106 MG/DL (ref 65–99)
HCT VFR BLD AUTO: 35.8 % (ref 37.5–51)
HGB BLD-MCNC: 12.2 G/DL (ref 13–17.7)
IMM GRANULOCYTES # BLD AUTO: 0.07 10*3/MM3 (ref 0–0.05)
IMM GRANULOCYTES NFR BLD AUTO: 0.7 % (ref 0–0.5)
LDH SERPL-CCNC: 230 U/L (ref 135–225)
LIPASE SERPL-CCNC: 107 U/L (ref 13–60)
LIPASE SERPL-CCNC: 136 U/L (ref 13–60)
LYMPHOCYTES # BLD AUTO: 1.58 10*3/MM3 (ref 0.7–3.1)
LYMPHOCYTES NFR BLD AUTO: 16.6 % (ref 19.6–45.3)
MCH RBC QN AUTO: 33.1 PG (ref 26.6–33)
MCHC RBC AUTO-ENTMCNC: 34.1 G/DL (ref 31.5–35.7)
MCV RBC AUTO: 97 FL (ref 79–97)
MONOCYTES # BLD AUTO: 0.9 10*3/MM3 (ref 0.1–0.9)
MONOCYTES NFR BLD AUTO: 9.5 % (ref 5–12)
NEUTROPHILS NFR BLD AUTO: 6.51 10*3/MM3 (ref 1.7–7)
NEUTROPHILS NFR BLD AUTO: 68.5 % (ref 42.7–76)
NRBC BLD AUTO-RTO: 0 /100 WBC (ref 0–0.2)
PLATELET # BLD AUTO: 158 10*3/MM3 (ref 140–450)
PMV BLD AUTO: 9.4 FL (ref 6–12)
POTASSIUM SERPL-SCNC: 4.3 MMOL/L (ref 3.5–5.2)
PROT SERPL-MCNC: 7 G/DL (ref 6–8.5)
RBC # BLD AUTO: 3.69 10*6/MM3 (ref 4.14–5.8)
SODIUM SERPL-SCNC: 141 MMOL/L (ref 136–145)
WBC NRBC COR # BLD: 9.51 10*3/MM3 (ref 3.4–10.8)

## 2023-07-17 PROCEDURE — 83690 ASSAY OF LIPASE: CPT

## 2023-07-17 PROCEDURE — 80053 COMPREHEN METABOLIC PANEL: CPT | Performed by: STUDENT IN AN ORGANIZED HEALTH CARE EDUCATION/TRAINING PROGRAM

## 2023-07-17 PROCEDURE — 99239 HOSP IP/OBS DSCHRG MGMT >30: CPT

## 2023-07-17 PROCEDURE — 83690 ASSAY OF LIPASE: CPT | Performed by: STUDENT IN AN ORGANIZED HEALTH CARE EDUCATION/TRAINING PROGRAM

## 2023-07-17 PROCEDURE — 85025 COMPLETE CBC W/AUTO DIFF WBC: CPT | Performed by: STUDENT IN AN ORGANIZED HEALTH CARE EDUCATION/TRAINING PROGRAM

## 2023-07-17 PROCEDURE — 83615 LACTATE (LD) (LDH) ENZYME: CPT

## 2023-07-17 PROCEDURE — 36415 COLL VENOUS BLD VENIPUNCTURE: CPT | Performed by: STUDENT IN AN ORGANIZED HEALTH CARE EDUCATION/TRAINING PROGRAM

## 2023-07-17 PROCEDURE — 25010000002 MORPHINE PER 10 MG: Performed by: STUDENT IN AN ORGANIZED HEALTH CARE EDUCATION/TRAINING PROGRAM

## 2023-07-17 RX ORDER — MORPHINE SULFATE 15 MG/1
15 TABLET, FILM COATED, EXTENDED RELEASE ORAL DAILY
Status: DISCONTINUED | OUTPATIENT
Start: 2023-07-18 | End: 2023-07-17 | Stop reason: HOSPADM

## 2023-07-17 RX ADMIN — PANTOPRAZOLE SODIUM 40 MG: 40 TABLET, DELAYED RELEASE ORAL at 08:20

## 2023-07-17 RX ADMIN — MORPHINE SULFATE 4 MG: 4 INJECTION, SOLUTION INTRAMUSCULAR; INTRAVENOUS at 02:55

## 2023-07-17 RX ADMIN — TIZANIDINE 4 MG: 4 TABLET ORAL at 11:37

## 2023-07-17 RX ADMIN — Medication 1 PATCH: at 13:34

## 2023-07-17 RX ADMIN — HYDROCODONE BITARTRATE AND ACETAMINOPHEN 1 TABLET: 10; 325 TABLET ORAL at 08:15

## 2023-07-17 RX ADMIN — DICYCLOMINE HYDROCHLORIDE 10 MG: 10 CAPSULE ORAL at 11:37

## 2023-07-17 RX ADMIN — SODIUM CHLORIDE 150 ML/HR: 9 INJECTION, SOLUTION INTRAVENOUS at 11:33

## 2023-07-17 RX ADMIN — DICYCLOMINE HYDROCHLORIDE 10 MG: 10 CAPSULE ORAL at 08:11

## 2023-07-17 RX ADMIN — Medication 10 ML: at 08:16

## 2023-07-17 RX ADMIN — CITALOPRAM HYDROBROMIDE 20 MG: 20 TABLET ORAL at 08:12

## 2023-07-17 RX ADMIN — HYDROCODONE BITARTRATE AND ACETAMINOPHEN 1 TABLET: 10; 325 TABLET ORAL at 11:37

## 2023-07-17 RX ADMIN — MORPHINE SULFATE 2 MG: 2 INJECTION, SOLUTION INTRAMUSCULAR; INTRAVENOUS at 06:29

## 2023-07-17 RX ADMIN — MORPHINE SULFATE 2 MG: 2 INJECTION, SOLUTION INTRAMUSCULAR; INTRAVENOUS at 01:15

## 2023-07-17 RX ADMIN — FERROUS SULFATE TAB EC 324 MG (65 MG FE EQUIVALENT) 324 MG: 324 (65 FE) TABLET DELAYED RESPONSE at 08:12

## 2023-07-17 RX ADMIN — TIZANIDINE 4 MG: 4 TABLET ORAL at 08:16

## 2023-07-17 NOTE — PLAN OF CARE
Goal Outcome Evaluation:  Plan of Care Reviewed With: patient        Progress: improving  Outcome Evaluation: IV fluids infusing. PRN morphine given for pain, additional one time dose ordered by provider. NPO diet. Family at bedside.

## 2023-07-17 NOTE — H&P
"    HISTORY AND PHYSICAL  NAME: Andres Murphy  : 1975  MRN: [id number]    DATE OF ADMISSION:  2023     DATE & TIME SEEN: 23 at 740pm    PCP: Jim Hardin MD    CODE STATUS:  Code Status and Medical Interventions:   Ordered at: 23     Level Of Support Discussed With:    Patient     Code Status (Patient has no pulse and is not breathing):    CPR (Attempt to Resuscitate)     Medical Interventions (Patient has pulse or is breathing):    Full Support         CHIEF COMPLAINT: Abdominal pain    HPI:  Andres Murphy is a 48 y.o. male with a CMH of hypertension, COPD, MS, IBS, diverticulitis, avascular necrosis of right hip, nicotine dependence, degenerative disc disease who presents with abdominal pain.  Patient reports pain started 1 month ago, but progressively worsened in the past week.  Pain located in the mid epigastric area and radiates across, endorsed fever yesterday 102, responded to med.  Pain is 4 out of 10 right now status post Dilaudid in the ED. vomiting, started 2 days ago, last vomiting episode was last night.  Denies any blood in the vomit.  Denies blood in the stool, or diarrhea.  Since Friday he has not been taking his blood pressure medication because of hypotension per his MD.   Patient reports he rarely drinks alcohol, last alcohol consumption was on Thursday, 1-2 beer.   Used to smoke 1.5 PPD, quit 2 years ago.  But continues to vape.     In the ED, patient received Dilaudid for pain control, Toradol, Zofran for nausea.  Labs remarkable for elevated Cr, elevated lipase, leukocytosis.  CT abdomen pelvis remarkable for ->\"Acute mild uncomplicated interstitial pancreatitis. Hepatic steatosis\".     CONCURRENT MEDICAL HISTORY:  Past Medical History:   Diagnosis Date    Avascular necrosis of hip, right     Degenerative disc disease, cervical     Encounter for routine adult health examination     IBS (irritable bowel syndrome)     MS (multiple sclerosis)  "       PAST SURGICAL HISTORY:  Past Surgical History:   Procedure Laterality Date    CARPAL TUNNEL RELEASE      COLONOSCOPY N/A 6/3/2020    Procedure: COLONOSCOPY;  Surgeon: Temo Andrade DO;  Location: [facility] ENDOSCOPY;  Service: Gastroenterology;  Laterality: N/A;    ENDOSCOPY N/A 6/3/2020    Procedure: ESOPHAGOGASTRODUODENOSCOPY;  Surgeon: Temo Andrade DO;  Location: [facility] ENDOSCOPY;  Service: Gastroenterology;  Laterality: N/A;    VASECTOMY         FAMILY HISTORY:  Family History   Problem Relation Age of Onset    Leukemia Mother     Cancer Other     Colon cancer Other     Depression Other     Diabetes Other     Osteoarthritis Other     Stroke Other     Thyroid disease Other     Diabetes Father     Colon cancer Father     Glaucoma Father         SOCIAL HISTORY:  Social History     Socioeconomic History    Marital status:    Tobacco Use    Smoking status: Former     Years: 15.00     Types: Cigarettes    Smokeless tobacco: Never   Substance and Sexual Activity    Alcohol use: No    Drug use: Never    Sexual activity: Defer       HOME MEDICATIONS:  Prior to Admission medications    Medication Sig Start Date End Date Taking? Authorizing Provider   albuterol sulfate  (90 Base) MCG/ACT inhaler Inhale 1 puff Every 4 (Four) Hours As Needed. 3/2/21   Dylan Mitchell MD   Ascorbic Acid (VITAMIN C PO) Take  by mouth Daily.    Dylan Mitchell MD   baclofen (LIORESAL) 20 MG tablet Take 20 mg by mouth 4 (Four) Times a Day. Alternating with Zanaflex    Dylan Mitchell MD   CALCIUM PO Take  by mouth Daily.    Dylan Mitchell MD   Cholecalciferol (VITAMIN D3 PO) Take  by mouth Daily.    Dylan Mitchell MD   citalopram (CeleXA) 20 MG tablet Take 1 tablet by mouth Daily. 9/29/22   Dylan Mitchell MD   dicyclomine (BENTYL) 10 MG capsule Take 10 mg by mouth 4 (Four) Times a Day Before Meals & at Bedtime.    Dylan Mitchell MD   diphenhydrAMINE (BENADRYL) 25 mg  capsule Take 25 mg by mouth At Night As Needed for Allergies or Sleep.    Dylan Mitchell MD   Ferrous Sulfate (IRON PO) Take  by mouth Daily.    Dylan Mitchell MD   HYDROcodone-acetaminophen (NORCO)  MG per tablet Take 1 tablet by mouth 4 (Four) Times a Day.    Dylan Mitchell MD   hydrOXYzine (ATARAX) 25 MG tablet Take 25 mg by mouth 2 (two) times a day.    Dylan Mitchell MD   Kesimpta 20 MG/0.4ML solution auto-injector Inject 20 mg under the skin into the appropriate area as directed Every 30 (Thirty) Days. 1/25/23   Donovan Hernandez PA   lisinopril (PRINIVIL,ZESTRIL) 10 MG tablet Take 10 mg by mouth Daily.    Dylan Mitchell MD   MAGNESIUM PO Take  by mouth Daily.    Dylan Mitchell MD   Melatonin 10 MG tablet Take 20 mg by mouth At Night As Needed. Takes 2 10mg tablets    Dylan Mitchell MD   meloxicam (MOBIC) 15 MG tablet Take 15 mg by mouth Daily.    Dylan Mitchell MD   Morphine (MS CONTIN) 15 MG 12 hr tablet Take 15 mg by mouth 2 (Two) Times a Day. 4/11/22   Dylan Mitchell MD   Omega-3 Fatty Acids (FISH OIL PO) Take  by mouth Daily.    Dylan Mitchell MD   pantoprazole (PROTONIX) 40 MG EC tablet Take 40 mg by mouth Daily.    Dylan Mitchell MD   Symbicort 80-4.5 MCG/ACT inhaler Inhale 2 puffs 2 (Two) Times a Day. 12/19/20   Dylan Mitchell MD   tadalafil (CIALIS) 5 MG tablet Take 5 mg by mouth Daily As Needed for Erectile Dysfunction.    Dylan Mitchell MD   tiZANidine (ZANAFLEX) 4 MG tablet Take 4 mg by mouth 4 (Four) Times a Day. Alternating with baclofen.    Dylan Mitchell MD   traZODone (DESYREL) 100 MG tablet Take 100 mg by mouth Every Night. 4/7/22   Dylan Mitchell MD   valACYclovir HCl (VALTREX PO) Take  by mouth Daily As Needed.    Dylan Mitchell MD   VITAMIN A PO Take  by mouth Daily.    Dylan Mitchell MD   VITAMIN D PO Take  by mouth Daily.    Dylan Mitchell MD     Not  taking mobic  Not doing Symbicort anymore    ALLERGIES:  Sulfa antibiotics and Phenergan [promethazine hcl]    REVIEW OF SYSTEMS  Review of Systems   Constitutional:  Positive for fever (yesterday 102). Negative for chills.   HENT:  Negative for congestion and rhinorrhea.    Respiratory:  Negative for cough and shortness of breath.    Cardiovascular:  Negative for chest pain and palpitations.   Gastrointestinal:  Positive for abdominal pain and nausea. Negative for blood in stool, constipation, diarrhea and vomiting.   Genitourinary:  Negative for difficulty urinating and dysuria.   Musculoskeletal:  Positive for arthralgias (chronic) and back pain (chronic).   Skin:  Negative for rash and wound.   Neurological:  Negative for dizziness, syncope and headaches.     PHYSICAL EXAM:  Temp:  [98.4 °F (36.9 °C)] 98.4 °F (36.9 °C)  Heart Rate:  [100-106] 102  Resp:  [20] 20  BP: (122-128)/(80-88) 128/88  Body mass index is 37.99 kg/m².  Physical Exam  Constitutional:       General: He is not in acute distress.     Appearance: He is ill-appearing.   HENT:      Head: Normocephalic and atraumatic.      Mouth/Throat:      Pharynx: No oropharyngeal exudate or posterior oropharyngeal erythema.   Eyes:      General: No scleral icterus.     Conjunctiva/sclera: Conjunctivae normal.      Pupils: Pupils are equal, round, and reactive to light.   Cardiovascular:      Rate and Rhythm: Normal rate and regular rhythm.      Pulses: Normal pulses.      Heart sounds: No murmur heard.  Pulmonary:      Effort: Pulmonary effort is normal. No respiratory distress.      Breath sounds: Normal breath sounds. No wheezing.   Abdominal:      Tenderness: There is abdominal tenderness (Mid epigastric, and LLQ).   Musculoskeletal:      Cervical back: Normal range of motion and neck supple.      Right lower leg: No edema.      Left lower leg: No edema.   Skin:     General: Skin is warm.      Capillary Refill: Capillary refill takes less than 2 seconds.       Findings: No lesion.   Neurological:      Mental Status: He is alert.       DIAGNOSTIC DATA:   Lab Results (last 24 hours)       Procedure Component Value Units Date/Time    Ethanol [[id number]] Collected: 07/16/23 1612    Specimen: Blood Updated: 07/16/23 2113     Ethanol <10 mg/dL      Ethanol % <0.010 %     Magnesium [[id number]] Collected: 07/16/23 1612    Specimen: Blood Updated: 07/16/23 2106    Lipid Panel [[id number]] Collected: 07/16/23 1612    Specimen: Blood Updated: 07/16/23 2106    [address] Draw [[id number]] Collected: 07/16/23 1612    Specimen: Blood Updated: 07/16/23 1715    Narrative:      The following orders were created for panel order [address] Draw.  Procedure                               Abnormality         Status                     ---------                               -----------         ------                     Green Top (Gel)[[id number]]                                  Final result               Lavender Top[[id number]]                                     Final result               Gold Top - SST[[id number]]                                   Final result               Light Blue Top[[id number]]                                   Final result                 Please view results for these tests on the individual orders.    Light Blue Top [[id number]] Collected: 07/16/23 1612    Specimen: Blood Updated: 07/16/23 1715     Extra Tube Hold for add-ons.     Comment: Auto resulted       Green Top (Gel) [[id number]] Collected: 07/16/23 1612    Specimen: Blood Updated: 07/16/23 1715     Extra Tube Hold for add-ons.     Comment: Auto resulted.       Lavender Top [[id number]] Collected: 07/16/23 1612    Specimen: Blood Updated: 07/16/23 1715     Extra Tube hold for add-on     Comment: Auto resulted       Gold Top - SST [[id number]] Collected: 07/16/23 1612    Specimen: Blood Updated: 07/16/23 1715     Extra Tube Hold for add-ons.     Comment: Auto resulted.       Comprehensive Metabolic Panel [[id number]]   (Abnormal) Collected: 07/16/23 1612    Specimen: Blood Updated: 07/16/23 1636     Glucose 133 mg/dL      BUN 23 mg/dL      Creatinine 1.30 mg/dL      Sodium 139 mmol/L      Potassium 5.2 mmol/L      Chloride 102 mmol/L      CO2 28.0 mmol/L      Calcium 9.9 mg/dL      Total Protein 7.8 g/dL      Albumin 4.2 g/dL      ALT (SGPT) 17 U/L      AST (SGOT) 15 U/L      Alkaline Phosphatase 73 U/L      Total Bilirubin 0.3 mg/dL      Globulin 3.6 gm/dL      A/G Ratio 1.2 g/dL      BUN/Creatinine Ratio 17.7     Anion Gap 9.0 mmol/L      eGFR 67.8 mL/min/1.73     Narrative:      GFR Normal >60  Chronic Kidney Disease <60  Kidney Failure <15      Lipase [[id number]]  (Abnormal) Collected: 07/16/23 1612    Specimen: Blood Updated: 07/16/23 1636     Lipase 188 U/L     CBC & Differential [[id number]]  (Abnormal) Collected: 07/16/23 1612    Specimen: Blood Updated: 07/16/23 1617    Narrative:      The following orders were created for panel order CBC & Differential.  Procedure                               Abnormality         Status                     ---------                               -----------         ------                     CBC Auto Differential[[id number]]        Abnormal            Final result                 Please view results for these tests on the individual orders.    CBC Auto Differential [[id number]]  (Abnormal) Collected: 07/16/23 1612    Specimen: Blood Updated: 07/16/23 1617     WBC 12.02 10*3/mm3      RBC 3.96 10*6/mm3      Hemoglobin 13.4 g/dL      Hematocrit 38.9 %      MCV 98.2 fL      MCH 33.8 pg      MCHC 34.4 g/dL      RDW 12.9 %      RDW-SD 46.6 fl      MPV 9.6 fL      Platelets 190 10*3/mm3      Neutrophil % 78.5 %      Lymphocyte % 10.1 %      Monocyte % 7.7 %      Eosinophil % 2.4 %      Basophil % 0.6 %      Immature Grans % 0.7 %      Neutrophils, Absolute 9.44 10*3/mm3      Lymphocytes, Absolute 1.21 10*3/mm3      Monocytes, Absolute 0.93 10*3/mm3      Eosinophils, Absolute 0.29 10*3/mm3       Basophils, Absolute 0.07 10*3/mm3      Immature Grans, Absolute 0.08 10*3/mm3      nRBC 0.0 /100 WBC              Imaging Results (Last 24 Hours)       Procedure Component Value Units Date/Time    CT Abdomen Pelvis With Contrast [[id number]] Collected: 07/16/23 1813     Updated: 07/16/23 1814    Narrative:      INDICATION:  LLQ pain.    COMPARISON:  None relevant.    TECHNIQUE:  Helical CT of the abdomen and pelvis was performed with intravenous contrast.  Multiplanar reformations were provided.    FINDINGS:  Mild inflammation about the pancreatic head evidence of pancreatic necrosis or  acute necrotic collection.  Fatty liver.  Biliary system, gallbladder, spleen,  adrenals, kidneys, appendix, bowel appear normal.  Urinary bladder decompressed.  No free fluid or lymphadenopathy.  Right femoral head osteonecrosis.      Impression:      Acute mild uncomplicated interstitial pancreatitis.  Hepatic steatosis.                  I reviewed the patient's new clinical results.    ASSESSMENT AND PLAN: This is a 48 y.o. male with:    Active and Resolved Problems  Active Hospital Problems    Diagnosis  POA    **Pancreatitis [K85.90]  Yes    VIRAL (acute kidney injury) [N17.9]  Unknown    COPD not affecting current episode of care [J44.9]  Unknown    Nicotine dependence due to vaping tobacco product [F17.290]  Unknown    Avascular necrosis of bone of right hip [M87.051]  Yes    MS (multiple sclerosis) [G35]  Yes      Resolved Hospital Problems   No resolved problems to display.     Presented with abdominal pain, was found to have pancreatitis on CT. Labs also remarkable for VIRAL.     Pancreatitis:  -elevated lipase, elevated leuk   Seems to be acute.  CT remarkable for Acute mild uncomplicated interstitial pancreatitis. Hepatic steatosis.   -N.p.o., strict I&O  -Continue IV hydration   -Supportive care with pain control, morphine ordered as needed   -Monitor serum electrolytes with CMP  -Mag ordered, pending result  -lipid  panel/A1c/UA/UDS pending  -advance diet as appropriate and as tolerated  -blood etoh pending  -placed on CIWA assessment, will assess if he needs med protocol.      Saud:  -most likely secondary to hypotension/dehydration due to N/V  -episodes of hypotension, MD held his bp med since Friday  -held bp med  -IV hydration  -trend cr w CMP    MS:  Cont home meds starting germania      Other chronic medical condition:  Cont home med  -since npo tonight, to be started germania after evaluation         DVT prophylaxis:  Medical DVT prophylaxis orders are present.     Andres Murphy and I have discussed pain goals for this hospitalization after reviewing his current clinical condition, medical history and prior pain experiences.  The goal is to keep the pain level controlled.  To help achieve this, I plan to use PRN medication.    PDMP reviewed and consistent with patient reported medications.    Expected Length of Stay: 1-3 days    I discussed the patient's findings and my recommendations with patient and family.     Dr. Camille Rose is the attending on record at time of admission, He is aware of the patient's status and agrees with the above history and physical.      This document has been electronically signed by Katarzyna Thibodeaux MD on July 16, 2023 21:16 CDT

## 2023-07-17 NOTE — ACP (ADVANCE CARE PLANNING)
Family Medicine Residency  Katarzyna Thibodeaux MD          The patient desires to be full code. He designates gustabo hare who can be contacted at [phone] to make medical decisions on his behalf if He is incapable of doing so. Patient declared said statement while fully alert and oriented on 07/16/23 at 19:21 CDT.      This document has been electronically signed by Katarzyna Thibodeaux MD on July 16, 2023 19:21 CDT

## 2023-07-17 NOTE — DISCHARGE SUMMARY
DISCHARGE SUMMARY    PATIENT NAME: Andres Murphy       PHYSICIAN: Domi Carter MD  : 1975  MRN: [id number]    ADMITTED: 2023     DISCHARGED: 2023    ADMISSION DIAGNOSES:  Active Hospital Problems    Diagnosis  POA    COPD not affecting current episode of care [J44.9]  Yes    Nicotine dependence due to vaping tobacco product [F17.290]  Yes    Avascular necrosis of bone of right hip [M87.051]  Yes    MS (multiple sclerosis) [G35]  Yes      Resolved Hospital Problems    Diagnosis Date Resolved POA    Substance use [F19.90] 2023 Yes    Pancreatitis [K85.90] 2023 Yes    VIRAL (acute kidney injury) [N17.9] 2023 Yes     DISCHARGE DIAGNOSES:   Active Hospital Problems    Diagnosis  POA    COPD not affecting current episode of care [J44.9]  Yes    Nicotine dependence due to vaping tobacco product [F17.290]  Yes    Avascular necrosis of bone of right hip [M87.051]  Yes    MS (multiple sclerosis) [G35]  Yes      Resolved Hospital Problems    Diagnosis Date Resolved POA    Substance use [F19.90] 2023 Yes    Pancreatitis [K85.90] 2023 Yes    VIRAL (acute kidney injury) [N17.9] 2023 Yes       SERVICE: Family Medicine Residency  Attending:  Camille Rose MD  Resident: Domi Carter MD    CONSULTS:   Consult Orders (all) (From admission, onward)      None            PROCEDURES:   None performed    HISTORY OF PRESENT ILLNESS:   Andres Murphy is a 48 y.o. male with a CMH of hypertension, COPD, MS, IBS, diverticulitis, avascular necrosis of right hip, nicotine dependence, degenerative disc disease who presents with abdominal pain.  Patient reports pain started 1 month ago, but progressively worsened in the past week.  Pain located in the mid epigastric area and radiates across, endorsed fever yesterday 102, responded to med.  Pain is 4 out of 10 right now status post Dilaudid in the ED. vomiting, started 2 days ago, last vomiting episode was  "last night.  Denies any blood in the vomit.  Denies blood in the stool, or diarrhea.  Since Friday he has not been taking his blood pressure medication because of hypotension per his MD.   Patient reports he rarely drinks alcohol, last alcohol consumption was on Thursday, 1-2 beer.   Used to smoke 1.5 PPD, quit 2 years ago.  But continues to vape.      In the ED, patient received Dilaudid for pain control, Toradol, Zofran for nausea.  Labs remarkable for elevated Cr, elevated lipase, leukocytosis.  CT abdomen pelvis remarkable for ->\"Acute mild uncomplicated interstitial pancreatitis. Hepatic steatosis\".        DIAGNOSTIC DATA:   Lab Results (last 24 hours)       Procedure Component Value Units Date/Time    Lipase [[id number]]  (Abnormal) Collected: 07/17/23 1418    Specimen: Blood Updated: 07/17/23 1507     Lipase 107 U/L     Lactate Dehydrogenase [[id number]]  (Abnormal) Collected: 07/17/23 0621    Specimen: Blood Updated: 07/17/23 1045      U/L      Comment: Specimen hemolyzed.  Results may be affected.       Comprehensive Metabolic Panel [[id number]]  (Abnormal) Collected: 07/17/23 0621    Specimen: Blood Updated: 07/17/23 0700     Glucose 106 mg/dL      BUN 17 mg/dL      Creatinine 0.92 mg/dL      Sodium 141 mmol/L      Potassium 4.3 mmol/L      Chloride 106 mmol/L      CO2 25.0 mmol/L      Calcium 9.0 mg/dL      Total Protein 7.0 g/dL      Albumin 3.8 g/dL      ALT (SGPT) 14 U/L      AST (SGOT) 16 U/L      Alkaline Phosphatase 60 U/L      Total Bilirubin 0.3 mg/dL      Globulin 3.2 gm/dL      A/G Ratio 1.2 g/dL      BUN/Creatinine Ratio 18.5     Anion Gap 10.0 mmol/L      eGFR 102.6 mL/min/1.73     Narrative:      GFR Normal >60  Chronic Kidney Disease <60  Kidney Failure <15      Lipase [[id number]]  (Abnormal) Collected: 07/17/23 0621    Specimen: Blood Updated: 07/17/23 0700     Lipase 136 U/L     CBC Auto Differential [[id number]]  (Abnormal) Collected: 07/17/23 0621    Specimen: Blood Updated: 07/17/23 " 0642     WBC 9.51 10*3/mm3      RBC 3.69 10*6/mm3      Hemoglobin 12.2 g/dL      Hematocrit 35.8 %      MCV 97.0 fL      MCH 33.1 pg      MCHC 34.1 g/dL      RDW 12.8 %      RDW-SD 45.2 fl      MPV 9.4 fL      Platelets 158 10*3/mm3      Neutrophil % 68.5 %      Lymphocyte % 16.6 %      Monocyte % 9.5 %      Eosinophil % 3.8 %      Basophil % 0.9 %      Immature Grans % 0.7 %      Neutrophils, Absolute 6.51 10*3/mm3      Lymphocytes, Absolute 1.58 10*3/mm3      Monocytes, Absolute 0.90 10*3/mm3      Eosinophils, Absolute 0.36 10*3/mm3      Basophils, Absolute 0.09 10*3/mm3      Immature Grans, Absolute 0.07 10*3/mm3      nRBC 0.0 /100 WBC     Fentanyl, Urine - Urine, Clean Catch [[id number]]  (Normal) Collected: 07/16/23 2150    Specimen: Urine, Clean Catch Updated: 07/16/23 2223     Fentanyl, Urine Negative    Narrative:      Negative Threshold:      Fentanyl 5 ng/mL     The normal value for the drug tested is negative. This report includes final unconfirmed screening results to be used for medical treatment purposes only. Unconfirmed results must not be used for non-medical purposes such as employment or legal testing. Clinical consideration should be applied to any drug of abuse test, particularly when unconfirmed results are used.           Urinalysis With Microscopic - Urine, Clean Catch [[id number]]  (Abnormal) Collected: 07/16/23 2150    Specimen: Urine, Clean Catch Updated: 07/16/23 2216    Narrative:      The following orders were created for panel order Urinalysis With Microscopic - Urine, Clean Catch.  Procedure                               Abnormality         Status                     ---------                               -----------         ------                     Urinalysis without micro...[[id number]]  Abnormal            Final result               Urinalysis, Microscopic ...[[id number]]  Abnormal            Final result                 Please view results for these tests on the individual orders.     Urinalysis without microscopic (no culture) - Urine, Clean Catch [[id number]]  (Abnormal) Collected: 07/16/23 2150    Specimen: Urine, Clean Catch Updated: 07/16/23 2216     Color, UA Yellow     Appearance, UA Clear     pH, UA <=5.0     Specific Gravity, UA 1.064     Comment: Result obtained by Refractometer        Glucose, UA Negative     Ketones, UA Negative     Bilirubin, UA Negative     Blood, UA Negative     Protein, UA 30 mg/dL (1+)     Leuk Esterase, UA Negative     Nitrite, UA Negative     Urobilinogen, UA 0.2 E.U./dL    Urinalysis, Microscopic Only - Urine, Clean Catch [[id number]]  (Abnormal) Collected: 07/16/23 2150    Specimen: Urine, Clean Catch Updated: 07/16/23 2216     RBC, UA 0-2 /HPF      WBC, UA 0-2 /HPF      Bacteria, UA None Seen /HPF      Squamous Epithelial Cells, UA 0-2 /HPF      Hyaline Casts, UA None Seen /LPF      Methodology Automated Microscopy    Urine Drug Screen - Urine, Clean Catch [[id number]]  (Abnormal) Collected: 07/16/23 2150    Specimen: Urine, Clean Catch Updated: 07/16/23 2213     THC, Screen, Urine Negative     Phencyclidine (PCP), Urine Negative     Cocaine Screen, Urine Negative     Methamphetamine, Ur Negative     Opiate Screen Positive     Amphetamine Screen, Urine Negative     Benzodiazepine Screen, Urine Positive     Tricyclic Antidepressants Screen Negative     Methadone Screen, Urine Negative     Barbiturates Screen, Urine Negative     Oxycodone Screen, Urine Negative     Propoxyphene Screen Negative     Buprenorphine, Screen, Urine Negative    Narrative:      Cutoff For Drugs Screened:    Amphetamines               500 ng/ml  Barbiturates               200 ng/ml  Benzodiazepines            150 ng/ml  Cocaine                    150 ng/ml  Methadone                  200 ng/ml  Opiates                    100 ng/ml  Phencyclidine               25 ng/ml  THC                            50 ng/ml  Methamphetamine            500 ng/ml  Tricyclic Antidepressants  300  ng/ml  Oxycodone                  100 ng/ml  Propoxyphene               300 ng/ml  Buprenorphine               10 ng/ml    The normal value for all drugs tested is negative. This report includes unconfirmed screening results, with the cutoff values listed, to be used for medical treatment purposes only.  Unconfirmed results must not be used for non-medical purposes such as employment or legal testing.  Clinical consideration should be applied to any drug of abuse test, particularly when unconfirmed results are used.      Magnesium [[id number]]  (Normal) Collected: 07/16/23 1612    Specimen: Blood Updated: 07/16/23 2118     Magnesium 2.1 mg/dL     Lipid Panel [[id number]]  (Abnormal) Collected: 07/16/23 1612    Specimen: Blood Updated: 07/16/23 2118     Total Cholesterol 191 mg/dL      Triglycerides 108 mg/dL      HDL Cholesterol 70 mg/dL      LDL Cholesterol  102 mg/dL      VLDL Cholesterol 19 mg/dL      LDL/HDL Ratio 1.42    Narrative:      Cholesterol Reference Ranges  (U.S. Department of Health and Human Services ATP III Classifications)    Desirable          <200 mg/dL  Borderline High    200-239 mg/dL  High Risk          >240 mg/dL      Triglyceride Reference Ranges  (U.S. Department of Health and Human Services ATP III Classifications)    Normal           <150 mg/dL  Borderline High  150-199 mg/dL  High             200-499 mg/dL  Very High        >500 mg/dL    HDL Reference Ranges  (U.S. Department of Health and Human Services ATP III Classifications)    Low     <40 mg/dl (major risk factor for CHD)  High    >60 mg/dl ('negative' risk factor for CHD)        LDL Reference Ranges  (U.S. Department of Health and Human Services ATP III Classifications)    Optimal          <100 mg/dL  Near Optimal     100-129 mg/dL  Borderline High  130-159 mg/dL  High             160-189 mg/dL  Very High        >189 mg/dL    Ethanol [[id number]] Collected: 07/16/23 1612    Specimen: Blood Updated: 07/16/23 2113     Ethanol <10 mg/dL       Ethanol % <0.010 %     [facility] Draw [[id number]] Collected: 07/16/23 1612    Specimen: Blood Updated: 07/16/23 1715    Narrative:      The following orders were created for panel order [facility] Draw.  Procedure                               Abnormality         Status                     ---------                               -----------         ------                     Green Top (Gel)[[id number]]                                  Final result               Lavender Top[[id number]]                                     Final result               Gold Top - SST[[id number]]                                   Final result               Light Blue Top[[id number]]                                   Final result                 Please view results for these tests on the individual orders.    Light Blue Top [[id number]] Collected: 07/16/23 1612    Specimen: Blood Updated: 07/16/23 1715     Extra Tube Hold for add-ons.     Comment: Auto resulted       Green Top (Gel) [[id number]] Collected: 07/16/23 1612    Specimen: Blood Updated: 07/16/23 1715     Extra Tube Hold for add-ons.     Comment: Auto resulted.       Lavender Top [[id number]] Collected: 07/16/23 1612    Specimen: Blood Updated: 07/16/23 1715     Extra Tube hold for add-on     Comment: Auto resulted       Gold Top - SST [[id number]] Collected: 07/16/23 1612    Specimen: Blood Updated: 07/16/23 1715     Extra Tube Hold for add-ons.     Comment: Auto resulted.       Comprehensive Metabolic Panel [[id number]]  (Abnormal) Collected: 07/16/23 1612    Specimen: Blood Updated: 07/16/23 1636     Glucose 133 mg/dL      BUN 23 mg/dL      Creatinine 1.30 mg/dL      Sodium 139 mmol/L      Potassium 5.2 mmol/L      Chloride 102 mmol/L      CO2 28.0 mmol/L      Calcium 9.9 mg/dL      Total Protein 7.8 g/dL      Albumin 4.2 g/dL      ALT (SGPT) 17 U/L      AST (SGOT) 15 U/L      Alkaline Phosphatase 73 U/L      Total Bilirubin 0.3 mg/dL      Globulin 3.6 gm/dL      A/G Ratio 1.2 g/dL       BUN/Creatinine Ratio 17.7     Anion Gap 9.0 mmol/L      eGFR 67.8 mL/min/1.73     Narrative:      GFR Normal >60  Chronic Kidney Disease <60  Kidney Failure <15      Lipase [[id number]]  (Abnormal) Collected: 07/16/23 1612    Specimen: Blood Updated: 07/16/23 1636     Lipase 188 U/L            Imaging Results (Last 48 Hours)       Procedure Component Value Units Date/Time    CT Abdomen Pelvis With Contrast [[id number]] Collected: 07/16/23 1813     Updated: 07/16/23 2124    Narrative:      INDICATION:  LLQ pain.    COMPARISON:  None relevant.    TECHNIQUE:  Helical CT of the abdomen and pelvis was performed with intravenous contrast.  Multiplanar reformations were provided.    FINDINGS:  Mild inflammation about the pancreatic head evidence of pancreatic necrosis or  acute necrotic collection.  Fatty liver.  Biliary system, gallbladder, spleen,  adrenals, kidneys, appendix, bowel appear normal.  Urinary bladder decompressed.  No free fluid or lymphadenopathy.  Right femoral head osteonecrosis.      Impression:      Acute mild uncomplicated interstitial pancreatitis.  Hepatic steatosis.                  HOSPITAL COURSE:    48 y.o. male presented to ED with abdominal pain and vomiting. Lipase was elevated 188 and CT of the abdomen demonstrated pancreatitis. Patient was admitted with Iv pain control with morphine 1mg and IV fluids for hydration while NPO. Patient did not have pain relief with 1mg of morphine, ultimately dose was increased to 4mg with response. On next day of admission patient stated his pain was better and he felt hungry. He was able to tolerate PO meds including PO home pain medications with sips and diet was advanced as tolerated. He no longer required IV pain medication for pain control and lipase recheck was 107. Patient was cleared for discharge with close follow up to primary care provider and was instructed to return if his symptoms returned or worsened. He was instructed to slowly return his diet  to normal. He did not have alcohol history, elevated triglycerides, or recent medication changes. Pancreatitis diagnosis was idiopathic in nature.       DISCHARGE CONDITION:   stable  Physical Activity: Not on file         DISPOSITION:  Home or Self Care    DISCHARGE MEDICATIONS     Discharge Medications        Continue These Medications        Instructions Start Date   albuterol sulfate  (90 Base) MCG/ACT inhaler  Commonly known as: PROVENTIL HFA;VENTOLIN HFA;PROAIR HFA   1 puff, Inhalation, Every 4 Hours PRN      baclofen 20 MG tablet  Commonly known as: LIORESAL   20 mg, Oral, 4 Times Daily, Alternating with Zanaflex      CALCIUM PO   Oral, Daily      citalopram 20 MG tablet  Commonly known as: CeleXA   20 mg, Oral, Daily      dicyclomine 10 MG capsule  Commonly known as: BENTYL   10 mg, Oral, 4 Times Daily Before Meals & Nightly      diphenhydrAMINE 25 mg capsule  Commonly known as: BENADRYL   25 mg, Oral, Nightly PRN      FISH OIL PO   Oral, Daily      HYDROcodone-acetaminophen  MG per tablet  Commonly known as: NORCO   1 tablet, Oral, 4 Times Daily      hydrOXYzine 25 MG tablet  Commonly known as: ATARAX   25 mg, Oral, 2 times daily      IRON PO   Oral, Daily      Kesimpta 20 MG/0.4ML solution auto-injector  Generic drug: Ofatumumab   20 mg, Subcutaneous, Every 30 Days      lisinopril 10 MG tablet  Commonly known as: PRINIVIL,ZESTRIL   10 mg, Oral, Daily      MAGNESIUM PO   Oral, Daily      Melatonin 10 MG tablet   20 mg, Oral, Nightly PRN, Takes 2 10mg tablets       meloxicam 15 MG tablet  Commonly known as: MOBIC   15 mg, Oral, Daily      Morphine 15 MG 12 hr tablet  Commonly known as: MS CONTIN   15 mg, Oral, 2 Times Daily      pantoprazole 40 MG EC tablet  Commonly known as: PROTONIX   40 mg, Oral, Daily      Symbicort 80-4.5 MCG/ACT inhaler  Generic drug: budesonide-formoterol   2 puffs, Inhalation, 2 Times Daily - RT      tadalafil 5 MG tablet  Commonly known as: CIALIS   5 mg, Oral, Daily  PRN      tiZANidine 4 MG tablet  Commonly known as: ZANAFLEX   4 mg, Oral, 4 Times Daily, Alternating with baclofen.      traZODone 100 MG tablet  Commonly known as: DESYREL   200 mg, Oral, Nightly      VALTREX PO   Oral, Daily PRN      VITAMIN A PO   Oral, Daily      VITAMIN C PO   Oral, Daily      VITAMIN D PO   Oral, Daily      VITAMIN D3 PO   Oral, Daily               INSTRUCTIONS:  Activity:   Activity Instructions       Activity as Tolerated            Diet:   Diet Instructions       Advance Diet As Tolerated -Target Diet: regular      Target Diet: regular            FOLLOW UP:   Additional Instructions for the Follow-ups that You Need to Schedule       Call MD With Problems / Concerns   As directed      Instructions: Call with new or worsening symptoms    Order Comments: Instructions: Call with new or worsening symptoms          Discharge Follow-up with PCP   As directed       Currently Documented PCP:    Jim Hardin MD    PCP Phone Number:    [phone]     Follow Up Details: hosp f/u [facility] 1 week                Follow-up Information       Jim Hardin MD .    Specialty: Internal Medicine  Why: hosp f/u pancre[facility] 1 week  JULY 21, 2023 aAT 8:00 A.M.  Contact information:  [address] [place] [address] [phone]                             PENDING TEST RESULTS AT DISCHARGE      Time: >30 minutes were spent in discharge planning, medication reconciliation and coordination of care for this patient.    Camille Rose MD is the attending at time of discharge, He is aware of the patient's status and agrees with the above discharge summary.            This document has been electronically signed by Domi Carter MD on July 17, 2023 16:37 CDT

## 2023-07-17 NOTE — DISCHARGE SUMMARY
DISCHARGE SUMMARY    PATIENT NAME: Andres Murphy       PHYSICIAN: Camille Rose MD  : 1975  MRN: [id number]    ADMITTED: 2023     DISCHARGED: ***    ADMISSION DIAGNOSES:  Active Hospital Problems    Diagnosis  POA    COPD not affecting current episode of care [J44.9]  Yes    Nicotine dependence due to vaping tobacco product [F17.290]  Yes    Avascular necrosis of bone of right hip [M87.051]  Yes    MS (multiple sclerosis) [G35]  Yes      Resolved Hospital Problems    Diagnosis Date Resolved POA    Substance use [F19.90] 2023 Yes    Pancreatitis [K85.90] 2023 Yes    VIRAL (acute kidney injury) [N17.9] 2023 Yes     DISCHARGE DIAGNOSES:   Active Hospital Problems    Diagnosis  POA    COPD not affecting current episode of care [J44.9]  Yes    Nicotine dependence due to vaping tobacco product [F17.290]  Yes    Avascular necrosis of bone of right hip [M87.051]  Yes    MS (multiple sclerosis) [G35]  Yes      Resolved Hospital Problems    Diagnosis Date Resolved POA    Substance use [F19.90] 2023 Yes    Pancreatitis [K85.90] 2023 Yes    VIRAL (acute kidney injury) [N17.9] 2023 Yes       SERVICE: Family Medicine Residency  Attending: {FMR Attendings:[id number]}  Resident: Camille Rose MD    CONSULTS:   Consult Orders (all) (From admission, onward)      None            PROCEDURES:   ***    HISTORY OF PRESENT ILLNESS:   Patient is a 48 y.o. male presented with ***.    DIAGNOSTIC DATA:   ***     HOSPITAL COURSE:  ***    DISCHARGE CONDITION:   ***    DISPOSITION:  Home or Self Care    DISCHARGE MEDICATIONS     Discharge Medications        Continue These Medications        Instructions Start Date   albuterol sulfate  (90 Base) MCG/ACT inhaler  Commonly known as: PROVENTIL HFA;VENTOLIN HFA;PROAIR HFA   1 puff, Inhalation, Every 4 Hours PRN      baclofen 20 MG tablet  Commonly known as: LIORESAL   20 mg, Oral, 4 Times Daily, Alternating with Zanaflex      CALCIUM PO    Oral, Daily      citalopram 20 MG tablet  Commonly known as: CeleXA   20 mg, Oral, Daily      dicyclomine 10 MG capsule  Commonly known as: BENTYL   10 mg, Oral, 4 Times Daily Before Meals & Nightly      diphenhydrAMINE 25 mg capsule  Commonly known as: BENADRYL   25 mg, Oral, Nightly PRN      FISH OIL PO   Oral, Daily      HYDROcodone-acetaminophen  MG per tablet  Commonly known as: NORCO   1 tablet, Oral, 4 Times Daily      hydrOXYzine 25 MG tablet  Commonly known as: ATARAX   25 mg, Oral, 2 times daily      IRON PO   Oral, Daily      Kesimpta 20 MG/0.4ML solution auto-injector  Generic drug: Ofatumumab   20 mg, Subcutaneous, Every 30 Days      lisinopril 10 MG tablet  Commonly known as: PRINIVIL,ZESTRIL   10 mg, Oral, Daily      MAGNESIUM PO   Oral, Daily      Melatonin 10 MG tablet   20 mg, Oral, Nightly PRN, Takes 2 10mg tablets       meloxicam 15 MG tablet  Commonly known as: MOBIC   15 mg, Oral, Daily      Morphine 15 MG 12 hr tablet  Commonly known as: MS CONTIN   15 mg, Oral, 2 Times Daily      pantoprazole 40 MG EC tablet  Commonly known as: PROTONIX   40 mg, Oral, Daily      Symbicort 80-4.5 MCG/ACT inhaler  Generic drug: budesonide-formoterol   2 puffs, Inhalation, 2 Times Daily - RT      tadalafil 5 MG tablet  Commonly known as: CIALIS   5 mg, Oral, Daily PRN      tiZANidine 4 MG tablet  Commonly known as: ZANAFLEX   4 mg, Oral, 4 Times Daily, Alternating with baclofen.      traZODone 100 MG tablet  Commonly known as: DESYREL   200 mg, Oral, Nightly      VALTREX PO   Oral, Daily PRN      VITAMIN A PO   Oral, Daily      VITAMIN C PO   Oral, Daily      VITAMIN D PO   Oral, Daily      VITAMIN D3 PO   Oral, Daily               INSTRUCTIONS:  Activity:   Activity Instructions       Activity as Tolerated            Diet:   Diet Instructions       Advance Diet As Tolerated -Target Diet: regular      Target Diet: regular            FOLLOW UP:   Additional Instructions for the Follow-ups that You Need to  Schedule       Call MD With Problems / Concerns   As directed      Instructions: Call with new or worsening symptoms    Order Comments: Instructions: Call with new or worsening symptoms          Discharge Follow-up with PCP   As directed       Currently Documented PCP:    Jim Hardin MD    PCP Phone Number:    [phone]     Follow Up Details: hosp f/u [facility] 1 week                Follow-up Information       Jim Hardin MD .    Specialty: Internal Medicine  Why: hosp f/u pancreait[facility] 1 week  Contact information:  [address] [place] [address]  [phone]                             PENDING TEST RESULTS AT DISCHARGE      Time: >30 minutes were spent in discharge planning, medication reconciliation and coordination of care for this patient.    {FMR Attendings:[id number]} is the attending at time of discharge, {Blank multiple:[id number]} is aware of the patient's status and agrees with the above discharge summary.    ***

## 2023-07-17 NOTE — ED NOTES
"Nursing report ED to floor  Andres Murphy  48 y.o.  male    HPI:   Chief Complaint   Patient presents with    Abdominal Pain       Admitting doctor:   Camille Rose MD    Consulting provider(s):  Consults       No orders found from 6/17/2023 to 7/17/2023.             Admitting diagnosis:   The encounter diagnosis was Acute pancreatitis, unspecified complication status, unspecified pancreatitis type.    Code status:   Current Code Status       Date Active Code Status Order ID Comments User Context       Prior            Allergies:   Sulfa antibiotics and Phenergan [promethazine hcl]    Intake and Output  No intake or output data in the 24 hours ending 07/16/23 2001    Weight:       07/16/23  1546   Weight: 124 kg (272 lb 6.4 oz)       Most recent vitals:   Vitals:    07/16/23 1546 07/16/23 1702 07/16/23 1817   BP: 128/83 122/80 128/88   BP Location: Right arm     Patient Position: Sitting     Pulse: 106 100 102   Resp: 20 20 20   Temp: 98.4 °F (36.9 °C)     TempSrc: Oral     SpO2: 94% 98% 98%   Weight: 124 kg (272 lb 6.4 oz)     Height: 180.3 cm (71\")       Oxygen Therapy: RA    Active LDAs/IV Access:   Lines, Drains & Airways       Active LDAs       Name Placement date Placement time Site Days    Peripheral IV 07/16/23 1612 Left Antecubital 07/16/23  1612  Antecubital  less than 1                    Labs (abnormal labs have a star):   Labs Reviewed   COMPREHENSIVE METABOLIC PANEL - Abnormal; Notable for the following components:       Result Value    Glucose 133 (*)     BUN 23 (*)     Creatinine 1.30 (*)     All other components within normal limits    Narrative:     GFR Normal >60  Chronic Kidney Disease <60  Kidney Failure <15     LIPASE - Abnormal; Notable for the following components:    Lipase 188 (*)     All other components within normal limits   CBC WITH AUTO DIFFERENTIAL - Abnormal; Notable for the following components:    WBC 12.02 (*)     RBC 3.96 (*)     MCV 98.2 (*)     MCH 33.8 (*)     " Neutrophil % 78.5 (*)     Lymphocyte % 10.1 (*)     Immature Grans % 0.7 (*)     Neutrophils, Absolute 9.44 (*)     Monocytes, Absolute 0.93 (*)     Immature Grans, Absolute 0.08 (*)     All other components within normal limits   RAINBOW DRAW    Narrative:     The following orders were created for panel order [facility] Draw.  Procedure                               Abnormality         Status                     ---------                               -----------         ------                     Green Top (Gel)[[id number]]                                  Final result               Lavender Top[[id number]]                                     Final result               Gold Top - SST[[id number]]                                   Final result               Light Blue Top[[id number]]                                   Final result                 Please view results for these tests on the individual orders.   CBC AND DIFFERENTIAL    Narrative:     The following orders were created for panel order CBC & Differential.  Procedure                               Abnormality         Status                     ---------                               -----------         ------                     CBC Auto Differential[[id number]]        Abnormal            Final result                 Please view results for these tests on the individual orders.   GREEN TOP   LAVENDER TOP   GOLD TOP - SST   LIGHT BLUE TOP       Meds given in ED:   Medications   sodium chloride 0.9 % flush 10 mL (has no administration in time range)   sodium chloride 0.9 % infusion (125 mL/hr Intravenous New Bag 7/16/23 1723)   ondansetron (ZOFRAN) injection 4 mg (4 mg Intravenous Given 7/16/23 1724)   HYDROmorphone (DILAUDID) injection 0.5 mg (0.5 mg Intravenous Given 7/16/23 1747)   ketorolac (TORADOL) injection 15 mg (15 mg Intravenous Given 7/16/23 1724)   iopamidol (ISOVUE-300) 61 % injection 100 mL (90 mL Intravenous Given 7/16/23 1738)     sodium chloride, 125  mL/hr, Last Rate: 125 mL/hr (07/16/23 1723)         NIH Stroke Scale:       Isolation/Infection(s):  No active isolations   No active infections     COVID Testing  Collected na  Resulted na    Nursing report ED to floor:  Mental status: A+O  Ambulatory status: independent  Precautions: na    ED nurse phone extentsion- [phone]

## 2023-07-17 NOTE — PAYOR COMM NOTE
"Amparo Anju  [facility]  Case Management Extender  [phone] phone  [phone] fax      Auth# [id number]       Andres Murphy (48 y.o. Male)       Date of Birth   1975    Social Security Number       Address   [place] [address]    Home Phone   [phone]    MRN   [id number]       Episcopal   Episcopal    Marital Status                               Admission Date   23    Admission Type   Emergency    Admitting Provider   Camille Rose MD    Attending Provider   Domi Carter MD    Department, Room/Bed   [facility] [address] [place], 427/1       Discharge Date       Discharge Disposition       Discharge Destination                                 Attending Provider: Domi Carter MD    Allergies: Sulfa Antibiotics, Phenergan [Promethazine Hcl]    Isolation: None   Infection: None   Code Status: CPR    Ht: 180.3 cm (71\")   Wt: 122 kg (268 lb 6.4 oz)    Admission Cmt: None   Principal Problem: Pancreatitis [K85.90]                   Active Insurance as of 2023       Primary Coverage       Payor Plan Insurance Group Employer/Plan Group    [facility] [employer] EMPLOYEE [id number]       Payor Plan Address Payor Plan Phone Number Payor Plan Fax Number Effective Dates    [address] [phone]  2015 - None Entered    [place] [address]         Subscriber Name Subscriber Birth Date Member ID       JENNIFER MURPHY 1978 [id number]                     Emergency Contacts        (Rel.) Home Phone Work Phone Mobile Phone    Oralia Murphy (Spouse) [phone] -- [phone]                 History & Physical        Katarzyna Thibodeaux MD at 23 1918              HISTORY AND PHYSICAL  NAME: Andres Murphy  : 1975  MRN: [id number]    DATE OF ADMISSION:  2023     DATE & TIME SEEN: 23 at 740pm    PCP: Jim Hardin, " "MD    CODE STATUS:  Code Status and Medical Interventions:   Ordered at: 07/16/23 2015     Level Of Support Discussed With:    Patient     Code Status (Patient has no pulse and is not breathing):    CPR (Attempt to Resuscitate)     Medical Interventions (Patient has pulse or is breathing):    Full Support         CHIEF COMPLAINT: Abdominal pain    HPI:  Andres Murphy is a 48 y.o. male with a CMH of hypertension, COPD, MS, IBS, diverticulitis, avascular necrosis of right hip, nicotine dependence, degenerative disc disease who presents with abdominal pain.  Patient reports pain started 1 month ago, but progressively worsened in the past week.  Pain located in the mid epigastric area and radiates across, endorsed fever yesterday 102, responded to med.  Pain is 4 out of 10 right now status post Dilaudid in the ED. vomiting, started 2 days ago, last vomiting episode was last night.  Denies any blood in the vomit.  Denies blood in the stool, or diarrhea.  Since Friday he has not been taking his blood pressure medication because of hypotension per his MD.   Patient reports he rarely drinks alcohol, last alcohol consumption was on Thursday, 1-2 beer.   Used to smoke 1.5 PPD, quit 2 years ago.  But continues to vape.     In the ED, patient received Dilaudid for pain control, Toradol, Zofran for nausea.  Labs remarkable for elevated Cr, elevated lipase, leukocytosis.  CT abdomen pelvis remarkable for ->\"Acute mild uncomplicated interstitial pancreatitis. Hepatic steatosis\".     CONCURRENT MEDICAL HISTORY:  Past Medical History:   Diagnosis Date    Avascular necrosis of hip, right     Degenerative disc disease, cervical     Encounter for routine adult health examination     IBS (irritable bowel syndrome)     MS (multiple sclerosis)        PAST SURGICAL HISTORY:  Past Surgical History:   Procedure Laterality Date    CARPAL TUNNEL RELEASE      COLONOSCOPY N/A 6/3/2020    Procedure: COLONOSCOPY;  Surgeon: Temo Andrade " DO DAVID;  Location: [facility] ENDOSCOPY;  Service: Gastroenterology;  Laterality: N/A;    ENDOSCOPY N/A 6/3/2020    Procedure: ESOPHAGOGASTRODUODENOSCOPY;  Surgeon: Temo Andrade DO;  Location: [facility] ENDOSCOPY;  Service: Gastroenterology;  Laterality: N/A;    VASECTOMY         FAMILY HISTORY:  Family History   Problem Relation Age of Onset    Leukemia Mother     Cancer Other     Colon cancer Other     Depression Other     Diabetes Other     Osteoarthritis Other     Stroke Other     Thyroid disease Other     Diabetes Father     Colon cancer Father     Glaucoma Father         SOCIAL HISTORY:  Social History     Socioeconomic History    Marital status:    Tobacco Use    Smoking status: Former     Years: 15.00     Types: Cigarettes    Smokeless tobacco: Never   Substance and Sexual Activity    Alcohol use: No    Drug use: Never    Sexual activity: Defer       HOME MEDICATIONS:  Prior to Admission medications    Medication Sig Start Date End Date Taking? Authorizing Provider   albuterol sulfate  (90 Base) MCG/ACT inhaler Inhale 1 puff Every 4 (Four) Hours As Needed. 3/2/21   Dylan Mitchell MD   Ascorbic Acid (VITAMIN C PO) Take  by mouth Daily.    Dylan Mitchell MD   baclofen (LIORESAL) 20 MG tablet Take 20 mg by mouth 4 (Four) Times a Day. Alternating with Zanaflex    Dylan Mitchell MD   CALCIUM PO Take  by mouth Daily.    Dylan Mitchell MD   Cholecalciferol (VITAMIN D3 PO) Take  by mouth Daily.    Dylan Mitchell MD   citalopram (CeleXA) 20 MG tablet Take 1 tablet by mouth Daily. 9/29/22   Dylan Mitchell MD   dicyclomine (BENTYL) 10 MG capsule Take 10 mg by mouth 4 (Four) Times a Day Before Meals & at Bedtime.    Dylan Mitchell MD   diphenhydrAMINE (BENADRYL) 25 mg capsule Take 25 mg by mouth At Night As Needed for Allergies or Sleep.    Dylan Mitchell MD   Ferrous Sulfate (IRON PO) Take  by mouth Daily.    Dylan Mitchell MD    HYDROcodone-acetaminophen (NORCO)  MG per tablet Take 1 tablet by mouth 4 (Four) Times a Day.    Dylan Mitchell MD   hydrOXYzine (ATARAX) 25 MG tablet Take 25 mg by mouth 2 (two) times a day.    Dylan Mitchell MD   Kesimpta 20 MG/0.4ML solution auto-injector Inject 20 mg under the skin into the appropriate area as directed Every 30 (Thirty) Days. 1/25/23   Donovan Hernandez PA   lisinopril (PRINIVIL,ZESTRIL) 10 MG tablet Take 10 mg by mouth Daily.    Dylan Mitchell MD   MAGNESIUM PO Take  by mouth Daily.    Dylan Mitchell MD   Melatonin 10 MG tablet Take 20 mg by mouth At Night As Needed. Takes 2 10mg tablets    Dylan Mitchell MD   meloxicam (MOBIC) 15 MG tablet Take 15 mg by mouth Daily.    Dylan Mitchell MD   Morphine (MS CONTIN) 15 MG 12 hr tablet Take 15 mg by mouth 2 (Two) Times a Day. 4/11/22   Dylan Mitchell MD   Omega-3 Fatty Acids (FISH OIL PO) Take  by mouth Daily.    Dylan Mitchell MD   pantoprazole (PROTONIX) 40 MG EC tablet Take 40 mg by mouth Daily.    Dylan Mitchell MD   Symbicort 80-4.5 MCG/ACT inhaler Inhale 2 puffs 2 (Two) Times a Day. 12/19/20   Dylan Mitchell MD   tadalafil (CIALIS) 5 MG tablet Take 5 mg by mouth Daily As Needed for Erectile Dysfunction.    Dylan Mitchell MD   tiZANidine (ZANAFLEX) 4 MG tablet Take 4 mg by mouth 4 (Four) Times a Day. Alternating with baclofen.    Dylan Mitchell MD   traZODone (DESYREL) 100 MG tablet Take 100 mg by mouth Every Night. 4/7/22   Dylan Mitchell MD   valACYclovir HCl (VALTREX PO) Take  by mouth Daily As Needed.    Dylan Mitchell MD   VITAMIN A PO Take  by mouth Daily.    Dylan Mitchell MD   VITAMIN D PO Take  by mouth Daily.    Dylan Mitchell MD     Not taking mobic  Not doing Symbicort anymore    ALLERGIES:  Sulfa antibiotics and Phenergan [promethazine hcl]    REVIEW OF SYSTEMS  Review of Systems   Constitutional:  Positive  for fever (yesterday 102). Negative for chills.   HENT:  Negative for congestion and rhinorrhea.    Respiratory:  Negative for cough and shortness of breath.    Cardiovascular:  Negative for chest pain and palpitations.   Gastrointestinal:  Positive for abdominal pain and nausea. Negative for blood in stool, constipation, diarrhea and vomiting.   Genitourinary:  Negative for difficulty urinating and dysuria.   Musculoskeletal:  Positive for arthralgias (chronic) and back pain (chronic).   Skin:  Negative for rash and wound.   Neurological:  Negative for dizziness, syncope and headaches.     PHYSICAL EXAM:  Temp:  [98.4 °F (36.9 °C)] 98.4 °F (36.9 °C)  Heart Rate:  [100-106] 102  Resp:  [20] 20  BP: (122-128)/(80-88) 128/88  Body mass index is 37.99 kg/m².  Physical Exam  Constitutional:       General: He is not in acute distress.     Appearance: He is ill-appearing.   HENT:      Head: Normocephalic and atraumatic.      Mouth/Throat:      Pharynx: No oropharyngeal exudate or posterior oropharyngeal erythema.   Eyes:      General: No scleral icterus.     Conjunctiva/sclera: Conjunctivae normal.      Pupils: Pupils are equal, round, and reactive to light.   Cardiovascular:      Rate and Rhythm: Normal rate and regular rhythm.      Pulses: Normal pulses.      Heart sounds: No murmur heard.  Pulmonary:      Effort: Pulmonary effort is normal. No respiratory distress.      Breath sounds: Normal breath sounds. No wheezing.   Abdominal:      Tenderness: There is abdominal tenderness (Mid epigastric, and LLQ).   Musculoskeletal:      Cervical back: Normal range of motion and neck supple.      Right lower leg: No edema.      Left lower leg: No edema.   Skin:     General: Skin is warm.      Capillary Refill: Capillary refill takes less than 2 seconds.      Findings: No lesion.   Neurological:      Mental Status: He is alert.       DIAGNOSTIC DATA:   Lab Results (last 24 hours)       Procedure Component Value Units Date/Time     Ethanol [[id number]] Collected: 07/16/23 1612    Specimen: Blood Updated: 07/16/23 2113     Ethanol <10 mg/dL      Ethanol % <0.010 %     Magnesium [[id number]] Collected: 07/16/23 1612    Specimen: Blood Updated: 07/16/23 2106    Lipid Panel [[id number]] Collected: 07/16/23 1612    Specimen: Blood Updated: 07/16/23 2106    [place] Draw [[id number]] Collected: 07/16/23 1612    Specimen: Blood Updated: 07/16/23 1715    Narrative:      The following orders were created for panel order [place] Draw.  Procedure                               Abnormality         Status                     ---------                               -----------         ------                     Green Top (Gel)[[id number]]                                  Final result               Lavender Top[[id number]]                                     Final result               Gold Top - SST[[id number]]                                   Final result               Light Blue Top[[id number]]                                   Final result                 Please view results for these tests on the individual orders.    Light Blue Top [[id number]] Collected: 07/16/23 1612    Specimen: Blood Updated: 07/16/23 1715     Extra Tube Hold for add-ons.     Comment: Auto resulted       Green Top (Gel) [[id number]] Collected: 07/16/23 1612    Specimen: Blood Updated: 07/16/23 1715     Extra Tube Hold for add-ons.     Comment: Auto resulted.       Lavender Top [[id number]] Collected: 07/16/23 1612    Specimen: Blood Updated: 07/16/23 1715     Extra Tube hold for add-on     Comment: Auto resulted       Gold Top - SST [[id number]] Collected: 07/16/23 1612    Specimen: Blood Updated: 07/16/23 1715     Extra Tube Hold for add-ons.     Comment: Auto resulted.       Comprehensive Metabolic Panel [[id number]]  (Abnormal) Collected: 07/16/23 1612    Specimen: Blood Updated: 07/16/23 1636     Glucose 133 mg/dL      BUN 23 mg/dL      Creatinine 1.30 mg/dL      Sodium 139 mmol/L       Potassium 5.2 mmol/L      Chloride 102 mmol/L      CO2 28.0 mmol/L      Calcium 9.9 mg/dL      Total Protein 7.8 g/dL      Albumin 4.2 g/dL      ALT (SGPT) 17 U/L      AST (SGOT) 15 U/L      Alkaline Phosphatase 73 U/L      Total Bilirubin 0.3 mg/dL      Globulin 3.6 gm/dL      A/G Ratio 1.2 g/dL      BUN/Creatinine Ratio 17.7     Anion Gap 9.0 mmol/L      eGFR 67.8 mL/min/1.73     Narrative:      GFR Normal >60  Chronic Kidney Disease <60  Kidney Failure <15      Lipase [[id number]]  (Abnormal) Collected: 07/16/23 1612    Specimen: Blood Updated: 07/16/23 1636     Lipase 188 U/L     CBC & Differential [[id number]]  (Abnormal) Collected: 07/16/23 1612    Specimen: Blood Updated: 07/16/23 1617    Narrative:      The following orders were created for panel order CBC & Differential.  Procedure                               Abnormality         Status                     ---------                               -----------         ------                     CBC Auto Differential[[id number]]        Abnormal            Final result                 Please view results for these tests on the individual orders.    CBC Auto Differential [[id number]]  (Abnormal) Collected: 07/16/23 1612    Specimen: Blood Updated: 07/16/23 1617     WBC 12.02 10*3/mm3      RBC 3.96 10*6/mm3      Hemoglobin 13.4 g/dL      Hematocrit 38.9 %      MCV 98.2 fL      MCH 33.8 pg      MCHC 34.4 g/dL      RDW 12.9 %      RDW-SD 46.6 fl      MPV 9.6 fL      Platelets 190 10*3/mm3      Neutrophil % 78.5 %      Lymphocyte % 10.1 %      Monocyte % 7.7 %      Eosinophil % 2.4 %      Basophil % 0.6 %      Immature Grans % 0.7 %      Neutrophils, Absolute 9.44 10*3/mm3      Lymphocytes, Absolute 1.21 10*3/mm3      Monocytes, Absolute 0.93 10*3/mm3      Eosinophils, Absolute 0.29 10*3/mm3      Basophils, Absolute 0.07 10*3/mm3      Immature Grans, Absolute 0.08 10*3/mm3      nRBC 0.0 /100 WBC              Imaging Results (Last 24 Hours)       Procedure Component  Value Units Date/Time    CT Abdomen Pelvis With Contrast [[id number]] Collected: 07/16/23 1813     Updated: 07/16/23 1814    Narrative:      INDICATION:  LLQ pain.    COMPARISON:  None relevant.    TECHNIQUE:  Helical CT of the abdomen and pelvis was performed with intravenous contrast.  Multiplanar reformations were provided.    FINDINGS:  Mild inflammation about the pancreatic head evidence of pancreatic necrosis or  acute necrotic collection.  Fatty liver.  Biliary system, gallbladder, spleen,  adrenals, kidneys, appendix, bowel appear normal.  Urinary bladder decompressed.  No free fluid or lymphadenopathy.  Right femoral head osteonecrosis.      Impression:      Acute mild uncomplicated interstitial pancreatitis.  Hepatic steatosis.                  I reviewed the patient's new clinical results.    ASSESSMENT AND PLAN: This is a 48 y.o. male with:    Active and Resolved Problems  Active Hospital Problems    Diagnosis  POA    **Pancreatitis [K85.90]  Yes    SAUD (acute kidney injury) [N17.9]  Unknown    COPD not affecting current episode of care [J44.9]  Unknown    Nicotine dependence due to vaping tobacco product [F17.290]  Unknown    Avascular necrosis of bone of right hip [M87.051]  Yes    MS (multiple sclerosis) [G35]  Yes      Resolved Hospital Problems   No resolved problems to display.     Presented with abdominal pain, was found to have pancreatitis on CT. Labs also remarkable for SAUD.     Pancreatitis:  -elevated lipase, elevated leuk   Seems to be acute.  CT remarkable for Acute mild uncomplicated interstitial pancreatitis. Hepatic steatosis.   -N.p.o., strict I&O  -Continue IV hydration   -Supportive care with pain control, morphine ordered as needed   -Monitor serum electrolytes with CMP  -Mag ordered, pending result  -lipid panel/A1c/UA/UDS pending  -advance diet as appropriate and as tolerated  -blood etoh pending  -placed on CIWA assessment, will assess if he needs med protocol.      Saud:  -most  likely secondary to hypotension/dehydration due to N/V  -episodes of hypotension, MD held his bp med since Friday  -held bp med  -IV hydration  -trend cr w CMP    MS:  Cont home meds starting germania      Other chronic medical condition:  Cont home med  -since npo tonight, to be started germania after evaluation         DVT prophylaxis:  Medical DVT prophylaxis orders are present.     Andres Don Katherine and I have discussed pain goals for this hospitalization after reviewing his current clinical condition, medical history and prior pain experiences.  The goal is to keep the pain level controlled.  To help achieve this, I plan to use PRN medication.    PDMP reviewed and consistent with patient reported medications.    Expected Length of Stay: 1-3 days    I discussed the patient's findings and my recommendations with patient and family.     Dr. Camille Rose is the attending on record at time of admission, He is aware of the patient's status and agrees with the above history and physical.      This document has been electronically signed by Katarzyna Thibodeaux MD on July 16, 2023 21:16 CDT       Electronically signed by Katarzyna Thibodeaux MD at 07/16/23 2116          Emergency Department Notes        Luh John, RN at 07/16/23 2001          Nursing report ED to floor  Andres Murphy  48 y.o.  male    HPI:   Chief Complaint   Patient presents with    Abdominal Pain       Admitting doctor:   Camille Rose MD    Consulting provider(s):  Consults       No orders found from 6/17/2023 to 7/17/2023.             Admitting diagnosis:   The encounter diagnosis was Acute pancreatitis, unspecified complication status, unspecified pancreatitis type.    Code status:   Current Code Status       Date Active Code Status Order ID Comments User Context       Prior            Allergies:   Sulfa antibiotics and Phenergan [promethazine hcl]    Intake and Output  No intake or output data in the 24 hours ending 07/16/23 2001    Weight:        "07/16/23  1546   Weight: 124 kg (272 lb 6.4 oz)       Most recent vitals:   Vitals:    07/16/23 1546 07/16/23 1702 07/16/23 1817   BP: 128/83 122/80 128/88   BP Location: Right arm     Patient Position: Sitting     Pulse: 106 100 102   Resp: 20 20 20   Temp: 98.4 °F (36.9 °C)     TempSrc: Oral     SpO2: 94% 98% 98%   Weight: 124 kg (272 lb 6.4 oz)     Height: 180.3 cm (71\")       Oxygen Therapy: RA    Active LDAs/IV Access:   Lines, Drains & Airways       Active LDAs       Name Placement date Placement time Site Days    Peripheral IV 07/16/23 1612 Left Antecubital 07/16/23  1612  Antecubital  less than 1                    Labs (abnormal labs have a star):   Labs Reviewed   COMPREHENSIVE METABOLIC PANEL - Abnormal; Notable for the following components:       Result Value    Glucose 133 (*)     BUN 23 (*)     Creatinine 1.30 (*)     All other components within normal limits    Narrative:     GFR Normal >60  Chronic Kidney Disease <60  Kidney Failure <15     LIPASE - Abnormal; Notable for the following components:    Lipase 188 (*)     All other components within normal limits   CBC WITH AUTO DIFFERENTIAL - Abnormal; Notable for the following components:    WBC 12.02 (*)     RBC 3.96 (*)     MCV 98.2 (*)     MCH 33.8 (*)     Neutrophil % 78.5 (*)     Lymphocyte % 10.1 (*)     Immature Grans % 0.7 (*)     Neutrophils, Absolute 9.44 (*)     Monocytes, Absolute 0.93 (*)     Immature Grans, Absolute 0.08 (*)     All other components within normal limits   RAINBOW DRAW    Narrative:     The following orders were created for panel order [place] Draw.  Procedure                               Abnormality         Status                     ---------                               -----------         ------                     Green Top (Gel)[[id number]]                                  Final result               Lavender Top[[id number]]                                     Final result               Gold Top - SST[[id number]]       "                             Final result               Light Blue Top[[id number]]                                   Final result                 Please view results for these tests on the individual orders.   CBC AND DIFFERENTIAL    Narrative:     The following orders were created for panel order CBC & Differential.  Procedure                               Abnormality         Status                     ---------                               -----------         ------                     CBC Auto Differential[[id number]]        Abnormal            Final result                 Please view results for these tests on the individual orders.   GREEN TOP   LAVENDER TOP   GOLD TOP - SST   LIGHT BLUE TOP       Meds given in ED:   Medications   sodium chloride 0.9 % flush 10 mL (has no administration in time range)   sodium chloride 0.9 % infusion (125 mL/hr Intravenous New Bag 7/16/23 1723)   ondansetron (ZOFRAN) injection 4 mg (4 mg Intravenous Given 7/16/23 1724)   HYDROmorphone (DILAUDID) injection 0.5 mg (0.5 mg Intravenous Given 7/16/23 1747)   ketorolac (TORADOL) injection 15 mg (15 mg Intravenous Given 7/16/23 1724)   iopamidol (ISOVUE-300) 61 % injection 100 mL (90 mL Intravenous Given 7/16/23 1738)     sodium chloride, 125 mL/hr, Last Rate: 125 mL/hr (07/16/23 1723)         NIH Stroke Scale:       Isolation/Infection(s):  No active isolations   No active infections     COVID Testing  Collected na  Resulted na    Nursing report ED to floor:  Mental status: A+O  Ambulatory status: independent  Precautions: na    ED nurse phone [phone]- [phone]    Electronically signed by Luh John RN at 07/16/23 2002       Reji Burnett MD at 07/16/23 1703          Subjective   History of Present Illness  Patient presents emergency department complaint of left lower quadrant abdominal pain.  Patient has prior history of polyps as well as diverticulosis and prior history of diverticulitis as well.  Patient states he is seen  Dr. Andrade in the past and has had his polyps removed.  Patient notes about 1 week of symptoms left lower quadrant pain with nausea.  Patient notes early satiety as well.  There is been no fevers there is been no diarrhea.  Patient has been ambulatory but has had decreased appetite with the symptoms.    History provided by:  Patient    Review of Systems   Constitutional:  Positive for activity change, appetite change and fatigue. Negative for chills and unexpected weight change.   Respiratory:  Negative for cough and shortness of breath.    Gastrointestinal:  Positive for nausea and vomiting. Negative for abdominal pain and diarrhea.   Genitourinary:  Negative for dysuria.   Musculoskeletal:  Negative for back pain.   Skin:  Negative for pallor.   Neurological:  Negative for headaches.   Psychiatric/Behavioral:  Negative for confusion and decreased concentration.    All other systems reviewed and are negative.    Past Medical History:   Diagnosis Date    Avascular necrosis of hip, right     Degenerative disc disease, cervical     Encounter for routine adult health examination     IBS (irritable bowel syndrome)     MS (multiple sclerosis)        Allergies   Allergen Reactions    Sulfa Antibiotics Anaphylaxis    Phenergan [Promethazine Hcl] Unknown - Low Severity       Past Surgical History:   Procedure Laterality Date    CARPAL TUNNEL RELEASE      COLONOSCOPY N/A 6/3/2020    Procedure: COLONOSCOPY;  Surgeon: Temo Andrade DO;  Location: [facility] ENDOSCOPY;  Service: Gastroenterology;  Laterality: N/A;    ENDOSCOPY N/A 6/3/2020    Procedure: ESOPHAGOGASTRODUODENOSCOPY;  Surgeon: Temo Andrade DO;  Location: [facility] ENDOSCOPY;  Service: Gastroenterology;  Laterality: N/A;    VASECTOMY         Family History   Problem Relation Age of Onset    Leukemia Mother     Cancer Other     Colon cancer Other     Depression Other     Diabetes Other     Osteoarthritis Other     Stroke Other     Thyroid disease Other      Diabetes Father     Colon cancer Father     Glaucoma Father        Social History     Socioeconomic History    Marital status:    Tobacco Use    Smoking status: Former     Years: 15.00     Types: Cigarettes    Smokeless tobacco: Never   Substance and Sexual Activity    Alcohol use: No    Drug use: Never    Sexual activity: Defer           Objective   Physical Exam  Vitals and nursing note reviewed.   Constitutional:       General: He is not in acute distress.     Appearance: He is well-developed. He is ill-appearing. He is not diaphoretic.   HENT:      Head: Normocephalic and atraumatic.   Eyes:      Conjunctiva/sclera: Conjunctivae normal.   Neck:      Vascular: No JVD.   Cardiovascular:      Rate and Rhythm: Normal rate and regular rhythm.      Heart sounds: Normal heart sounds. No murmur heard.    No friction rub. No gallop.   Pulmonary:      Effort: Pulmonary effort is normal. No respiratory distress.      Breath sounds: No wheezing or rales.   Chest:      Chest wall: No tenderness.   Abdominal:      General: Bowel sounds are normal. There is no distension.      Palpations: Abdomen is soft. There is no mass.      Tenderness: There is abdominal tenderness in the left lower quadrant. There is no guarding or rebound.      Comments: No guarding, rigidity or rebound.   Musculoskeletal:         General: Normal range of motion.      Cervical back: Normal range of motion and neck supple.   Skin:     General: Skin is warm and dry.      Capillary Refill: Capillary refill takes less than 2 seconds.   Neurological:      General: No focal deficit present.      Mental Status: He is alert and oriented to person, place, and time.   Psychiatric:         Mood and Affect: Mood normal.         Behavior: Behavior normal.         Thought Content: Thought content normal.         Judgment: Judgment normal.       Procedures          ED Course                                           Medical Decision Making  Signs symptoms  consistent with pancreatitis.  This appears mild on CT scanning.  No obvious cholecystitis or cholelithiasis is noted.  Patient does drink alcohol though not a large amount per his account.  Patient will be admitted for further evaluation work-up.    Problems Addressed:  Acute pancreatitis, unspecified complication status, unspecified pancreatitis type: complicated acute illness or injury    Amount and/or Complexity of Data Reviewed  Labs: ordered.  Radiology: ordered.    Risk  Prescription drug management.  Decision regarding hospitalization.      Labs Reviewed   COMPREHENSIVE METABOLIC PANEL - Abnormal; Notable for the following components:       Result Value    Glucose 133 (*)     BUN 23 (*)     Creatinine 1.30 (*)     All other components within normal limits    Narrative:     GFR Normal >60  Chronic Kidney Disease <60  Kidney Failure <15     LIPASE - Abnormal; Notable for the following components:    Lipase 188 (*)     All other components within normal limits   CBC WITH AUTO DIFFERENTIAL - Abnormal; Notable for the following components:    WBC 12.02 (*)     RBC 3.96 (*)     MCV 98.2 (*)     MCH 33.8 (*)     Neutrophil % 78.5 (*)     Lymphocyte % 10.1 (*)     Immature Grans % 0.7 (*)     Neutrophils, Absolute 9.44 (*)     Monocytes, Absolute 0.93 (*)     Immature Grans, Absolute 0.08 (*)     All other components within normal limits   RAINBOW DRAW    Narrative:     The following orders were created for panel order [place] Draw.  Procedure                               Abnormality         Status                     ---------                               -----------         ------                     Green Top (Gel)[[id number]]                                  Final result               Lavender Top[[id number]]                                     Final result               Gold Top - [facility][[id number]]                                   Final result               Light Blue Top[[id number]]                                    Final result                 Please view results for these tests on the individual orders.   CBC AND DIFFERENTIAL    Narrative:     The following orders were created for panel order CBC & Differential.  Procedure                               Abnormality         Status                     ---------                               -----------         ------                     CBC Auto Differential[[id number]]        Abnormal            Final result                 Please view results for these tests on the individual orders.   GREEN TOP   LAVENDER TOP   GOLD TOP - SST   LIGHT BLUE TOP       CT Abdomen Pelvis With Contrast   Preliminary Result   Acute mild uncomplicated interstitial pancreatitis.  Hepatic steatosis.                  Final diagnoses:   Acute pancreatitis, unspecified complication status, unspecified pancreatitis type       ED Disposition  ED Disposition       ED Disposition   Decision to Admit    Condition   --    Comment   Level of Care: Med/Surg [1]   Diagnosis: Pancreatitis [202663]   Admitting Physician: BERNIE HOPSON [[id number]]   Attending Physician: BERNIE HOPSON [[id number]]   Certification: I Certify That Inpatient Hospital Services Are Medically Necessary For Greater Than 2 Midnights                 No follow-up provider specified.       Medication List      No changes were made to your prescriptions during this visit.            Reji Burnett MD  07/16/23 1954      Electronically signed by Reji Burnett MD at 07/16/23 1954       Lab Results (last 48 hours)       Procedure Component Value Units Date/Time    Lactate Dehydrogenase [[id number]]  (Abnormal) Collected: 07/17/23 0621    Specimen: Blood Updated: 07/17/23 1045      U/L      Comment: Specimen hemolyzed.  Results may be affected.       Comprehensive Metabolic Panel [[id number]]  (Abnormal) Collected: 07/17/23 0621    Specimen: Blood Updated: 07/17/23 0700     Glucose 106 mg/dL      BUN 17 mg/dL      Creatinine 0.92 mg/dL       Sodium 141 mmol/L      Potassium 4.3 mmol/L      Chloride 106 mmol/L      CO2 25.0 mmol/L      Calcium 9.0 mg/dL      Total Protein 7.0 g/dL      Albumin 3.8 g/dL      ALT (SGPT) 14 U/L      AST (SGOT) 16 U/L      Alkaline Phosphatase 60 U/L      Total Bilirubin 0.3 mg/dL      Globulin 3.2 gm/dL      A/G Ratio 1.2 g/dL      BUN/Creatinine Ratio 18.5     Anion Gap 10.0 mmol/L      eGFR 102.6 mL/min/1.73     Narrative:      GFR Normal >60  Chronic Kidney Disease <60  Kidney Failure <15      Lipase [[id number]]  (Abnormal) Collected: 07/17/23 0621    Specimen: Blood Updated: 07/17/23 0700     Lipase 136 U/L     CBC Auto Differential [[id number]]  (Abnormal) Collected: 07/17/23 0621    Specimen: Blood Updated: 07/17/23 0642     WBC 9.51 10*3/mm3      RBC 3.69 10*6/mm3      Hemoglobin 12.2 g/dL      Hematocrit 35.8 %      MCV 97.0 fL      MCH 33.1 pg      MCHC 34.1 g/dL      RDW 12.8 %      RDW-SD 45.2 fl      MPV 9.4 fL      Platelets 158 10*3/mm3      Neutrophil % 68.5 %      Lymphocyte % 16.6 %      Monocyte % 9.5 %      Eosinophil % 3.8 %      Basophil % 0.9 %      Immature Grans % 0.7 %      Neutrophils, Absolute 6.51 10*3/mm3      Lymphocytes, Absolute 1.58 10*3/mm3      Monocytes, Absolute 0.90 10*3/mm3      Eosinophils, Absolute 0.36 10*3/mm3      Basophils, Absolute 0.09 10*3/mm3      Immature Grans, Absolute 0.07 10*3/mm3      nRBC 0.0 /100 WBC     Fentanyl, Urine - Urine, Clean Catch [[id number]]  (Normal) Collected: 07/16/23 2150    Specimen: Urine, Clean Catch Updated: 07/16/23 2223     Fentanyl, Urine Negative    Narrative:      Negative Threshold:      Fentanyl 5 ng/mL     The normal value for the drug tested is negative. This report includes final unconfirmed screening results to be used for medical treatment purposes only. Unconfirmed results must not be used for non-medical purposes such as employment or legal testing. Clinical consideration should be applied to any drug of abuse test, particularly  when unconfirmed results are used.           Urinalysis With Microscopic - Urine, Clean Catch [[id number]]  (Abnormal) Collected: 07/16/23 2150    Specimen: Urine, Clean Catch Updated: 07/16/23 2216    Narrative:      The following orders were created for panel order Urinalysis With Microscopic - Urine, Clean Catch.  Procedure                               Abnormality         Status                     ---------                               -----------         ------                     Urinalysis without micro...[[id number]]  Abnormal            Final result               Urinalysis, Microscopic ...[[id number]]  Abnormal            Final result                 Please view results for these tests on the individual orders.    Urinalysis without microscopic (no culture) - Urine, Clean Catch [[id number]]  (Abnormal) Collected: 07/16/23 2150    Specimen: Urine, Clean Catch Updated: 07/16/23 2216     Color, UA Yellow     Appearance, UA Clear     pH, UA <=5.0     Specific Gravity, UA 1.064     Comment: Result obtained by Refractometer        Glucose, UA Negative     Ketones, UA Negative     Bilirubin, UA Negative     Blood, UA Negative     Protein, UA 30 mg/dL (1+)     Leuk Esterase, UA Negative     Nitrite, UA Negative     Urobilinogen, UA 0.2 E.U./dL    Urinalysis, Microscopic Only - Urine, Clean Catch [[id number]]  (Abnormal) Collected: 07/16/23 2150    Specimen: Urine, Clean Catch Updated: 07/16/23 2216     RBC, UA 0-2 /HPF      WBC, UA 0-2 /HPF      Bacteria, UA None Seen /HPF      Squamous Epithelial Cells, UA 0-2 /HPF      Hyaline Casts, UA None Seen /LPF      Methodology Automated Microscopy    Urine Drug Screen - Urine, Clean Catch [[id number]]  (Abnormal) Collected: 07/16/23 2150    Specimen: Urine, Clean Catch Updated: 07/16/23 2213     THC, Screen, Urine Negative     Phencyclidine (PCP), Urine Negative     Cocaine Screen, Urine Negative     Methamphetamine, Ur Negative     Opiate Screen Positive     Amphetamine  Screen, Urine Negative     Benzodiazepine Screen, Urine Positive     Tricyclic Antidepressants Screen Negative     Methadone Screen, Urine Negative     Barbiturates Screen, Urine Negative     Oxycodone Screen, Urine Negative     Propoxyphene Screen Negative     Buprenorphine, Screen, Urine Negative    Narrative:      Cutoff For Drugs Screened:    Amphetamines               500 ng/ml  Barbiturates               200 ng/ml  Benzodiazepines            150 ng/ml  Cocaine                    150 ng/ml  Methadone                  200 ng/ml  Opiates                    100 ng/ml  Phencyclidine               25 ng/ml  THC                            50 ng/ml  Methamphetamine            500 ng/ml  Tricyclic Antidepressants  300 ng/ml  Oxycodone                  100 ng/ml  Propoxyphene               300 ng/ml  Buprenorphine               10 ng/ml    The normal value for all drugs tested is negative. This report includes unconfirmed screening results, with the cutoff values listed, to be used for medical treatment purposes only.  Unconfirmed results must not be used for non-medical purposes such as employment or legal testing.  Clinical consideration should be applied to any drug of abuse test, particularly when unconfirmed results are used.      Magnesium [[id number]]  (Normal) Collected: 07/16/23 1612    Specimen: Blood Updated: 07/16/23 2118     Magnesium 2.1 mg/dL     Lipid Panel [[id number]]  (Abnormal) Collected: 07/16/23 1612    Specimen: Blood Updated: 07/16/23 2118     Total Cholesterol 191 mg/dL      Triglycerides 108 mg/dL      HDL Cholesterol 70 mg/dL      LDL Cholesterol  102 mg/dL      VLDL Cholesterol 19 mg/dL      LDL/HDL Ratio 1.42    Narrative:      Cholesterol Reference Ranges  (U.S. Department of Health and Human Services ATP III Classifications)    Desirable          <200 mg/dL  Borderline High    200-239 mg/dL  High Risk          >240 mg/dL      Triglyceride Reference Ranges  (U.S. Department of Health and  Human Services ATP III Classifications)    Normal           <150 mg/dL  Borderline High  150-199 mg/dL  High             200-499 mg/dL  Very High        >500 mg/dL    HDL Reference Ranges  (U.S. Department of Health and Human Services ATP III Classifications)    Low     <40 mg/dl (major risk factor for CHD)  High    >60 mg/dl ('negative' risk factor for CHD)        LDL Reference Ranges  (U.S. Department of Health and Human Services ATP III Classifications)    Optimal          <100 mg/dL  Near Optimal     100-129 mg/dL  Borderline High  130-159 mg/dL  High             160-189 mg/dL  Very High        >189 mg/dL    Ethanol [[id number]] Collected: 07/16/23 1612    Specimen: Blood Updated: 07/16/23 2113     Ethanol <10 mg/dL      Ethanol % <0.010 %     [place] Draw [[id number]] Collected: 07/16/23 1612    Specimen: Blood Updated: 07/16/23 1715    Narrative:      The following orders were created for panel order [place] Draw.  Procedure                               Abnormality         Status                     ---------                               -----------         ------                     Green Top (Gel)[[id number]]                                  Final result               Lavender Top[[id number]]                                     Final result               Gold Top - SST[[id number]]                                   Final result               Light Blue Top[[id number]]                                   Final result                 Please view results for these tests on the individual orders.    Light Blue Top [[id number]] Collected: 07/16/23 1612    Specimen: Blood Updated: 07/16/23 1715     Extra Tube Hold for add-ons.     Comment: Auto resulted       Green Top (Gel) [[id number]] Collected: 07/16/23 1612    Specimen: Blood Updated: 07/16/23 1715     Extra Tube Hold for add-ons.     Comment: Auto resulted.       Lavender Top [[id number]] Collected: 07/16/23 1612    Specimen: Blood Updated: 07/16/23 1715     Extra  Tube hold for add-on     Comment: Auto resulted       Gold Top - SST [[id number]] Collected: 07/16/23 1612    Specimen: Blood Updated: 07/16/23 1715     Extra Tube Hold for add-ons.     Comment: Auto resulted.       Comprehensive Metabolic Panel [[id number]]  (Abnormal) Collected: 07/16/23 1612    Specimen: Blood Updated: 07/16/23 1636     Glucose 133 mg/dL      BUN 23 mg/dL      Creatinine 1.30 mg/dL      Sodium 139 mmol/L      Potassium 5.2 mmol/L      Chloride 102 mmol/L      CO2 28.0 mmol/L      Calcium 9.9 mg/dL      Total Protein 7.8 g/dL      Albumin 4.2 g/dL      ALT (SGPT) 17 U/L      AST (SGOT) 15 U/L      Alkaline Phosphatase 73 U/L      Total Bilirubin 0.3 mg/dL      Globulin 3.6 gm/dL      A/G Ratio 1.2 g/dL      BUN/Creatinine Ratio 17.7     Anion Gap 9.0 mmol/L      eGFR 67.8 mL/min/1.73     Narrative:      GFR Normal >60  Chronic Kidney Disease <60  Kidney Failure <15      Lipase [[id number]]  (Abnormal) Collected: 07/16/23 1612    Specimen: Blood Updated: 07/16/23 1636     Lipase 188 U/L     CBC & Differential [[id number]]  (Abnormal) Collected: 07/16/23 1612    Specimen: Blood Updated: 07/16/23 1617    Narrative:      The following orders were created for panel order CBC & Differential.  Procedure                               Abnormality         Status                     ---------                               -----------         ------                     CBC Auto Differential[[id number]]        Abnormal            Final result                 Please view results for these tests on the individual orders.    CBC Auto Differential [[id number]]  (Abnormal) Collected: 07/16/23 1612    Specimen: Blood Updated: 07/16/23 1617     WBC 12.02 10*3/mm3      RBC 3.96 10*6/mm3      Hemoglobin 13.4 g/dL      Hematocrit 38.9 %      MCV 98.2 fL      MCH 33.8 pg      MCHC 34.4 g/dL      RDW 12.9 %      RDW-SD 46.6 fl      MPV 9.6 fL      Platelets 190 10*3/mm3      Neutrophil % 78.5 %      Lymphocyte % 10.1 %       Monocyte % 7.7 %      Eosinophil % 2.4 %      Basophil % 0.6 %      Immature Grans % 0.7 %      Neutrophils, Absolute 9.44 10*3/mm3      Lymphocytes, Absolute 1.21 10*3/mm3      Monocytes, Absolute 0.93 10*3/mm3      Eosinophils, Absolute 0.29 10*3/mm3      Basophils, Absolute 0.07 10*3/mm3      Immature Grans, Absolute 0.08 10*3/mm3      nRBC 0.0 /100 WBC           Imaging Results (Last 48 Hours)       Procedure Component Value Units Date/Time    CT Abdomen Pelvis With Contrast [[id number]] Collected: 07/16/23 1813     Updated: 07/16/23 2124    Narrative:      INDICATION:  LLQ pain.    COMPARISON:  None relevant.    TECHNIQUE:  Helical CT of the abdomen and pelvis was performed with intravenous contrast.  Multiplanar reformations were provided.    FINDINGS:  Mild inflammation about the pancreatic head evidence of pancreatic necrosis or  acute necrotic collection.  Fatty liver.  Biliary system, gallbladder, spleen,  adrenals, kidneys, appendix, bowel appear normal.  Urinary bladder decompressed.  No free fluid or lymphadenopathy.  Right femoral head osteonecrosis.      Impression:      Acute mild uncomplicated interstitial pancreatitis.  Hepatic steatosis.              ECG/EMG Results (last 48 hours)       ** No results found for the last 48 hours. **          Physician Progress Notes (last 48 hours)  Notes from 07/15/23 1057 through 07/17/23 1057   No notes of this type exist for this encounter.       Medical Student Notes (last 48 hours)  Notes from 07/15/23 1057 through 07/17/23 1057   No notes of this type exist for this encounter.       Consult Notes (last 48 hours)  Notes from 07/15/23 1057 through 07/17/23 1057   No notes of this type exist for this encounter.

## 2023-07-18 NOTE — PAYOR COMM NOTE
"Amparo Worleymore  [facility]  Case Management Extender  [phone] phone  [phone] fax      Ref# [id number]     Andres Murphy (48 y.o. Male)       Date of Birth   1975    Social Security Number       Address   [place] [address]    Home Phone   [phone]    MRN   [id number]       Protestant   Jewish    Marital Status                               Admission Date   7/16/23    Admission Type   Emergency    Admitting Provider   Camille Rose MD    Attending Provider       Department, Room/Bed   [facility] [place], 427/1       Discharge Date   7/17/2023    Discharge Disposition   Home or Self Care    Discharge Destination                                 Attending Provider: (none)   Allergies: Sulfa Antibiotics, Phenergan [Promethazine Hcl]    Isolation: None   Infection: None   Code Status: Prior    Ht: 180.3 cm (71\")   Wt: 122 kg (268 lb 6.4 oz)    Admission Cmt: None   Principal Problem: None                  Active Insurance as of 7/16/2023       Primary Coverage       Payor Plan Insurance Group Employer/Plan Group    [facility] [employer] [facility] [employer] EMPLOYEE [id number]       Payor Plan Address Payor Plan Phone Number Payor Plan Fax Number Effective Dates    [address] [phone]  1/1/2015 - None Entered    [place] [address]         Subscriber Name Subscriber Birth Date Member ID       JENNIFER MURPHY 1/12/1978 [id number]                     Emergency Contacts        (Rel.) Home Phone Work Phone Mobile Phone    Oralia Murphy (Spouse) [phone] -- [phone]                 Discharge Summary        Domi Carter MD at 07/17/23 1622       Attestation signed by Camille Rose MD at 07/17/23 1751    I have reviewed this documentation and agree. Same day admit and discharge. Please see H&P for full attestation.      [facility] " Family Medicine Residency  [address] [place] [address]  Office: [phone]  This document has been electronically signed by Camille Rose MD on 2023 17:57 CDT                        DISCHARGE SUMMARY    PATIENT NAME: Andres Murphy       PHYSICIAN: Domi Carter MD  : 1975  MRN: [id number]    ADMITTED: 2023     DISCHARGED: 2023    ADMISSION DIAGNOSES:  Active Hospital Problems    Diagnosis  POA    COPD not affecting current episode of care [J44.9]  Yes    Nicotine dependence due to vaping tobacco product [F17.290]  Yes    Avascular necrosis of bone of right hip [M87.051]  Yes    MS (multiple sclerosis) [G35]  Yes      Resolved Hospital Problems    Diagnosis Date Resolved POA    Substance use [F19.90] 2023 Yes    Pancreatitis [K85.90] 2023 Yes    VIRAL (acute kidney injury) [N17.9] 2023 Yes     DISCHARGE DIAGNOSES:   Active Hospital Problems    Diagnosis  POA    COPD not affecting current episode of care [J44.9]  Yes    Nicotine dependence due to vaping tobacco product [F17.290]  Yes    Avascular necrosis of bone of right hip [M87.051]  Yes    MS (multiple sclerosis) [G35]  Yes      Resolved Hospital Problems    Diagnosis Date Resolved POA    Substance use [F19.90] 2023 Yes    Pancreatitis [K85.90] 2023 Yes    VIRAL (acute kidney injury) [N17.9] 2023 Yes       SERVICE: Family Medicine Residency  Attending:  Camille Rose MD  Resident: Domi Carter MD    CONSULTS:   Consult Orders (all) (From admission, onward)      None            PROCEDURES:   None performed    HISTORY OF PRESENT ILLNESS:   Andres Murphy is a 48 y.o. male with a CMH of hypertension, COPD, MS, IBS, diverticulitis, avascular necrosis of right hip, nicotine dependence, degenerative disc disease who presents with abdominal pain.  Patient reports pain started 1 month ago, but progressively worsened in the past week.  Pain located in the mid  "epigastric area and radiates across, endorsed fever yesterday 102, responded to med.  Pain is 4 out of 10 right now status post Dilaudid in the ED. vomiting, started 2 days ago, last vomiting episode was last night.  Denies any blood in the vomit.  Denies blood in the stool, or diarrhea.  Since Friday he has not been taking his blood pressure medication because of hypotension per his MD.   Patient reports he rarely drinks alcohol, last alcohol consumption was on Thursday, 1-2 beer.   Used to smoke 1.5 PPD, quit 2 years ago.  But continues to vape.      In the ED, patient received Dilaudid for pain control, Toradol, Zofran for nausea.  Labs remarkable for elevated Cr, elevated lipase, leukocytosis.  CT abdomen pelvis remarkable for ->\"Acute mild uncomplicated interstitial pancreatitis. Hepatic steatosis\".        DIAGNOSTIC DATA:   Lab Results (last 24 hours)       Procedure Component Value Units Date/Time    Lipase [[id number]]  (Abnormal) Collected: 07/17/23 1418    Specimen: Blood Updated: 07/17/23 1507     Lipase 107 U/L     Lactate Dehydrogenase [[id number]]  (Abnormal) Collected: 07/17/23 0621    Specimen: Blood Updated: 07/17/23 1045      U/L      Comment: Specimen hemolyzed.  Results may be affected.       Comprehensive Metabolic Panel [[id number]]  (Abnormal) Collected: 07/17/23 0621    Specimen: Blood Updated: 07/17/23 0700     Glucose 106 mg/dL      BUN 17 mg/dL      Creatinine 0.92 mg/dL      Sodium 141 mmol/L      Potassium 4.3 mmol/L      Chloride 106 mmol/L      CO2 25.0 mmol/L      Calcium 9.0 mg/dL      Total Protein 7.0 g/dL      Albumin 3.8 g/dL      ALT (SGPT) 14 U/L      AST (SGOT) 16 U/L      Alkaline Phosphatase 60 U/L      Total Bilirubin 0.3 mg/dL      Globulin 3.2 gm/dL      A/G Ratio 1.2 g/dL      BUN/Creatinine Ratio 18.5     Anion Gap 10.0 mmol/L      eGFR 102.6 mL/min/1.73     Narrative:      GFR Normal >60  Chronic Kidney Disease <60  Kidney Failure <15      Lipase [[id number]]  " (Abnormal) Collected: 07/17/23 0621    Specimen: Blood Updated: 07/17/23 0700     Lipase 136 U/L     CBC Auto Differential [[id number]]  (Abnormal) Collected: 07/17/23 0621    Specimen: Blood Updated: 07/17/23 0642     WBC 9.51 10*3/mm3      RBC 3.69 10*6/mm3      Hemoglobin 12.2 g/dL      Hematocrit 35.8 %      MCV 97.0 fL      MCH 33.1 pg      MCHC 34.1 g/dL      RDW 12.8 %      RDW-SD 45.2 fl      MPV 9.4 fL      Platelets 158 10*3/mm3      Neutrophil % 68.5 %      Lymphocyte % 16.6 %      Monocyte % 9.5 %      Eosinophil % 3.8 %      Basophil % 0.9 %      Immature Grans % 0.7 %      Neutrophils, Absolute 6.51 10*3/mm3      Lymphocytes, Absolute 1.58 10*3/mm3      Monocytes, Absolute 0.90 10*3/mm3      Eosinophils, Absolute 0.36 10*3/mm3      Basophils, Absolute 0.09 10*3/mm3      Immature Grans, Absolute 0.07 10*3/mm3      nRBC 0.0 /100 WBC     Fentanyl, Urine - Urine, Clean Catch [[id number]]  (Normal) Collected: 07/16/23 2150    Specimen: Urine, Clean Catch Updated: 07/16/23 2223     Fentanyl, Urine Negative    Narrative:      Negative Threshold:      Fentanyl 5 ng/mL     The normal value for the drug tested is negative. This report includes final unconfirmed screening results to be used for medical treatment purposes only. Unconfirmed results must not be used for non-medical purposes such as employment or legal testing. Clinical consideration should be applied to any drug of abuse test, particularly when unconfirmed results are used.           Urinalysis With Microscopic - Urine, Clean Catch [[id number]]  (Abnormal) Collected: 07/16/23 2150    Specimen: Urine, Clean Catch Updated: 07/16/23 2216    Narrative:      The following orders were created for panel order Urinalysis With Microscopic - Urine, Clean Catch.  Procedure                               Abnormality         Status                     ---------                               -----------         ------                     Urinalysis without  micro...[[id number]]  Abnormal            Final result               Urinalysis, Microscopic ...[[id number]]  Abnormal            Final result                 Please view results for these tests on the individual orders.    Urinalysis without microscopic (no culture) - Urine, Clean Catch [[id number]]  (Abnormal) Collected: 07/16/23 2150    Specimen: Urine, Clean Catch Updated: 07/16/23 2216     Color, UA Yellow     Appearance, UA Clear     pH, UA <=5.0     Specific Gravity, UA 1.064     Comment: Result obtained by Refractometer        Glucose, UA Negative     Ketones, UA Negative     Bilirubin, UA Negative     Blood, UA Negative     Protein, UA 30 mg/dL (1+)     Leuk Esterase, UA Negative     Nitrite, UA Negative     Urobilinogen, UA 0.2 E.U./dL    Urinalysis, Microscopic Only - Urine, Clean Catch [[id number]]  (Abnormal) Collected: 07/16/23 2150    Specimen: Urine, Clean Catch Updated: 07/16/23 2216     RBC, UA 0-2 /HPF      WBC, UA 0-2 /HPF      Bacteria, UA None Seen /HPF      Squamous Epithelial Cells, UA 0-2 /HPF      Hyaline Casts, UA None Seen /LPF      Methodology Automated Microscopy    Urine Drug Screen - Urine, Clean Catch [[id number]]  (Abnormal) Collected: 07/16/23 2150    Specimen: Urine, Clean Catch Updated: 07/16/23 2213     THC, Screen, Urine Negative     Phencyclidine (PCP), Urine Negative     Cocaine Screen, Urine Negative     Methamphetamine, Ur Negative     Opiate Screen Positive     Amphetamine Screen, Urine Negative     Benzodiazepine Screen, Urine Positive     Tricyclic Antidepressants Screen Negative     Methadone Screen, Urine Negative     Barbiturates Screen, Urine Negative     Oxycodone Screen, Urine Negative     Propoxyphene Screen Negative     Buprenorphine, Screen, Urine Negative    Narrative:      Cutoff For Drugs Screened:    Amphetamines               500 ng/ml  Barbiturates               200 ng/ml  Benzodiazepines            150 ng/ml  Cocaine                    150 ng/ml  Methadone                   200 ng/ml  Opiates                    100 ng/ml  Phencyclidine               25 ng/ml  THC                            50 ng/ml  Methamphetamine            500 ng/ml  Tricyclic Antidepressants  300 ng/ml  Oxycodone                  100 ng/ml  Propoxyphene               300 ng/ml  Buprenorphine               10 ng/ml    The normal value for all drugs tested is negative. This report includes unconfirmed screening results, with the cutoff values listed, to be used for medical treatment purposes only.  Unconfirmed results must not be used for non-medical purposes such as employment or legal testing.  Clinical consideration should be applied to any drug of abuse test, particularly when unconfirmed results are used.      Magnesium [[id number]]  (Normal) Collected: 07/16/23 1612    Specimen: Blood Updated: 07/16/23 2118     Magnesium 2.1 mg/dL     Lipid Panel [[id number]]  (Abnormal) Collected: 07/16/23 1612    Specimen: Blood Updated: 07/16/23 2118     Total Cholesterol 191 mg/dL      Triglycerides 108 mg/dL      HDL Cholesterol 70 mg/dL      LDL Cholesterol  102 mg/dL      VLDL Cholesterol 19 mg/dL      LDL/HDL Ratio 1.42    Narrative:      Cholesterol Reference Ranges  (U.S. Department of Health and Human Services ATP III Classifications)    Desirable          <200 mg/dL  Borderline High    200-239 mg/dL  High Risk          >240 mg/dL      Triglyceride Reference Ranges  (U.S. Department of Health and Human Services ATP III Classifications)    Normal           <150 mg/dL  Borderline High  150-199 mg/dL  High             200-499 mg/dL  Very High        >500 mg/dL    HDL Reference Ranges  (U.S. Department of Health and Human Services ATP III Classifications)    Low     <40 mg/dl (major risk factor for CHD)  High    >60 mg/dl ('negative' risk factor for CHD)        LDL Reference Ranges  (U.S. Department of Health and Human Services ATP III Classifications)    Optimal          <100 mg/dL  Near Optimal     100-129  mg/dL  Borderline High  130-159 mg/dL  High             160-189 mg/dL  Very High        >189 mg/dL    Ethanol [[id number]] Collected: 07/16/23 1612    Specimen: Blood Updated: 07/16/23 2113     Ethanol <10 mg/dL      Ethanol % <0.010 %     [place] Draw [[id number]] Collected: 07/16/23 1612    Specimen: Blood Updated: 07/16/23 1715    Narrative:      The following orders were created for panel order [place] Draw.  Procedure                               Abnormality         Status                     ---------                               -----------         ------                     Green Top (Gel)[[id number]]                                  Final result               Lavender Top[[id number]]                                     Final result               Gold Top - SST[[id number]]                                   Final result               Light Blue Top[[id number]]                                   Final result                 Please view results for these tests on the individual orders.    Light Blue Top [[id number]] Collected: 07/16/23 1612    Specimen: Blood Updated: 07/16/23 1715     Extra Tube Hold for add-ons.     Comment: Auto resulted       Green Top (Gel) [[id number]] Collected: 07/16/23 1612    Specimen: Blood Updated: 07/16/23 1715     Extra Tube Hold for add-ons.     Comment: Auto resulted.       Lavender Top [[id number]] Collected: 07/16/23 1612    Specimen: Blood Updated: 07/16/23 1715     Extra Tube hold for add-on     Comment: Auto resulted       Gold Top - SST [[id number]] Collected: 07/16/23 1612    Specimen: Blood Updated: 07/16/23 1715     Extra Tube Hold for add-ons.     Comment: Auto resulted.       Comprehensive Metabolic Panel [[id number]]  (Abnormal) Collected: 07/16/23 1612    Specimen: Blood Updated: 07/16/23 1636     Glucose 133 mg/dL      BUN 23 mg/dL      Creatinine 1.30 mg/dL      Sodium 139 mmol/L      Potassium 5.2 mmol/L      Chloride 102 mmol/L      CO2 28.0 mmol/L      Calcium 9.9 mg/dL       Total Protein 7.8 g/dL      Albumin 4.2 g/dL      ALT (SGPT) 17 U/L      AST (SGOT) 15 U/L      Alkaline Phosphatase 73 U/L      Total Bilirubin 0.3 mg/dL      Globulin 3.6 gm/dL      A/G Ratio 1.2 g/dL      BUN/Creatinine Ratio 17.7     Anion Gap 9.0 mmol/L      eGFR 67.8 mL/min/1.73     Narrative:      GFR Normal >60  Chronic Kidney Disease <60  Kidney Failure <15      Lipase [[id number]]  (Abnormal) Collected: 07/16/23 1612    Specimen: Blood Updated: 07/16/23 1636     Lipase 188 U/L            Imaging Results (Last 48 Hours)       Procedure Component Value Units Date/Time    CT Abdomen Pelvis With Contrast [[id number]] Collected: 07/16/23 1813     Updated: 07/16/23 2124    Narrative:      INDICATION:  LLQ pain.    COMPARISON:  None relevant.    TECHNIQUE:  Helical CT of the abdomen and pelvis was performed with intravenous contrast.  Multiplanar reformations were provided.    FINDINGS:  Mild inflammation about the pancreatic head evidence of pancreatic necrosis or  acute necrotic collection.  Fatty liver.  Biliary system, gallbladder, spleen,  adrenals, kidneys, appendix, bowel appear normal.  Urinary bladder decompressed.  No free fluid or lymphadenopathy.  Right femoral head osteonecrosis.      Impression:      Acute mild uncomplicated interstitial pancreatitis.  Hepatic steatosis.                  HOSPITAL COURSE:    48 y.o. male presented to ED with abdominal pain and vomiting. Lipase was elevated 188 and CT of the abdomen demonstrated pancreatitis. Patient was admitted with Iv pain control with morphine 1mg and IV fluids for hydration while NPO. Patient did not have pain relief with 1mg of morphine, ultimately dose was increased to 4mg with response. On next day of admission patient stated his pain was better and he felt hungry. He was able to tolerate PO meds including PO home pain medications with sips and diet was advanced as tolerated. He no longer required IV pain medication for pain control and  lipase recheck was 107. Patient was cleared for discharge with close follow up to primary care provider and was instructed to return if his symptoms returned or worsened. He was instructed to slowly return his diet to normal. He did not have alcohol history, elevated triglycerides, or recent medication changes. Pancreatitis diagnosis was idiopathic in nature.       DISCHARGE CONDITION:   stable  Physical Activity: Not on file         DISPOSITION:  Home or Self Care    DISCHARGE MEDICATIONS     Discharge Medications        Continue These Medications        Instructions Start Date   albuterol sulfate  (90 Base) MCG/ACT inhaler  Commonly known as: PROVENTIL HFA;VENTOLIN HFA;PROAIR HFA   1 puff, Inhalation, Every 4 Hours PRN      baclofen 20 MG tablet  Commonly known as: LIORESAL   20 mg, Oral, 4 Times Daily, Alternating with Zanaflex      CALCIUM PO   Oral, Daily      citalopram 20 MG tablet  Commonly known as: CeleXA   20 mg, Oral, Daily      dicyclomine 10 MG capsule  Commonly known as: BENTYL   10 mg, Oral, 4 Times Daily Before Meals & Nightly      diphenhydrAMINE 25 mg capsule  Commonly known as: BENADRYL   25 mg, Oral, Nightly PRN      FISH OIL PO   Oral, Daily      HYDROcodone-acetaminophen  MG per tablet  Commonly known as: NORCO   1 tablet, Oral, 4 Times Daily      hydrOXYzine 25 MG tablet  Commonly known as: ATARAX   25 mg, Oral, 2 times daily      IRON PO   Oral, Daily      Kesimpta 20 MG/0.4ML solution auto-injector  Generic drug: Ofatumumab   20 mg, Subcutaneous, Every 30 Days      lisinopril 10 MG tablet  Commonly known as: PRINIVIL,ZESTRIL   10 mg, Oral, Daily      MAGNESIUM PO   Oral, Daily      Melatonin 10 MG tablet   20 mg, Oral, Nightly PRN, Takes 2 10mg tablets       meloxicam 15 MG tablet  Commonly known as: MOBIC   15 mg, Oral, Daily      Morphine 15 MG 12 hr tablet  Commonly known as: MS CONTIN   15 mg, Oral, 2 Times Daily      pantoprazole 40 MG EC tablet  Commonly known as:  PROTONIX   40 mg, Oral, Daily      Symbicort 80-4.5 MCG/ACT inhaler  Generic drug: budesonide-formoterol   2 puffs, Inhalation, 2 Times Daily - RT      tadalafil 5 MG tablet  Commonly known as: CIALIS   5 mg, Oral, Daily PRN      tiZANidine 4 MG tablet  Commonly known as: ZANAFLEX   4 mg, Oral, 4 Times Daily, Alternating with baclofen.      traZODone 100 MG tablet  Commonly known as: DESYREL   200 mg, Oral, Nightly      VALTREX PO   Oral, Daily PRN      VITAMIN A PO   Oral, Daily      VITAMIN C PO   Oral, Daily      VITAMIN D PO   Oral, Daily      VITAMIN D3 PO   Oral, Daily               INSTRUCTIONS:  Activity:   Activity Instructions       Activity as Tolerated            Diet:   Diet Instructions       Advance Diet As Tolerated -Target Diet: regular      Target Diet: regular            FOLLOW UP:   Additional Instructions for the Follow-ups that You Need to Schedule       Call MD With Problems / Concerns   As directed      Instructions: Call with new or worsening symptoms    Order Comments: Instructions: Call with new or worsening symptoms          Discharge Follow-up with PCP   As directed       Currently Documented PCP:    Jim Hardin MD    PCP Phone Number:    [phone]     Follow Up Details: hosp f/u pancre[facility] 1 week                Follow-up Information       Jim Hardin MD .    Specialty: Internal Medicine  Why: hosp f/u pancre[facility] 1 week  JULY 21, 2023 aAT 8:00 A.M.  Contact information:  [address] [place] [id number] [phone]                             PENDING TEST RESULTS AT DISCHARGE      Time: >30 minutes were spent in discharge planning, medication reconciliation and coordination of care for this patient.    Camille Rose MD is the attending at time of discharge, He is aware of the patient's status and agrees with the above discharge summary.            This document has been electronically signed by Domi Carter MD on July 17, 2023 16:37  CDT            Electronically signed by Bernie Rose MD at 07/17/23 175       Discharge Order (From admission, onward)       Start     Ordered    07/17/23 1517  Discharge patient  Once        Expected Discharge Date: 07/17/23    Discharge Disposition: Home or Self Care    Physician of Record for Attribution - Please select from Treatment Team: BERNIE ROSE [[id number]]    Review needed by CMO to determine Physician of Record: No       Question Answer Comment   Physician of Record for Attribution - Please select from Treatment Team BERNIE ROSE    Review needed by CMO to determine Physician of Record No        07/17/23 1510

## 2023-07-31 DIAGNOSIS — G35 MS (MULTIPLE SCLEROSIS): ICD-10-CM

## 2023-08-01 ENCOUNTER — PATIENT MESSAGE (OUTPATIENT)
Dept: NEUROLOGY | Facility: CLINIC | Age: 48
End: 2023-08-01
Payer: COMMERCIAL

## 2023-08-01 ENCOUNTER — TELEPHONE (OUTPATIENT)
Dept: NEUROLOGY | Facility: CLINIC | Age: 48
End: 2023-08-01
Payer: COMMERCIAL

## 2023-08-01 RX ORDER — OFATUMUMAB 20 MG/.4ML
20 INJECTION, SOLUTION SUBCUTANEOUS
Qty: 0.4 ML | Refills: 2 | Status: SHIPPED | OUTPATIENT
Start: 2023-08-01

## 2023-10-20 DIAGNOSIS — G35 MS (MULTIPLE SCLEROSIS): ICD-10-CM

## 2023-10-20 RX ORDER — OFATUMUMAB 20 MG/.4ML
INJECTION, SOLUTION SUBCUTANEOUS
Qty: 0.4 ML | Refills: 1 | Status: SHIPPED | OUTPATIENT
Start: 2023-10-20

## 2023-12-19 DIAGNOSIS — G35 MS (MULTIPLE SCLEROSIS): ICD-10-CM

## 2023-12-19 RX ORDER — OFATUMUMAB 20 MG/.4ML
20 INJECTION, SOLUTION SUBCUTANEOUS
Qty: 1.2 ML | Refills: 1 | Status: SHIPPED | OUTPATIENT
Start: 2023-12-19

## 2024-05-20 ENCOUNTER — OFFICE VISIT (OUTPATIENT)
Dept: NEUROLOGY | Facility: CLINIC | Age: 49
End: 2024-05-20
Payer: COMMERCIAL

## 2024-05-20 VITALS
BODY MASS INDEX: 38.36 KG/M2 | DIASTOLIC BLOOD PRESSURE: 80 MMHG | OXYGEN SATURATION: 97 % | SYSTOLIC BLOOD PRESSURE: 140 MMHG | WEIGHT: 274 LBS | HEART RATE: 112 BPM | HEIGHT: 71 IN

## 2024-05-20 DIAGNOSIS — G35 MS (MULTIPLE SCLEROSIS): Primary | ICD-10-CM

## 2024-05-20 DIAGNOSIS — G82.50 QUADRIPARESIS: ICD-10-CM

## 2024-05-20 PROCEDURE — 99214 OFFICE O/P EST MOD 30 MIN: CPT | Performed by: PHYSICIAN ASSISTANT

## 2024-05-20 NOTE — PROGRESS NOTES
Subjective   Andres Murphy is a 49 y.o. male is here today for follow-up.    History of Present Illness  Doing well with regard to his multiple sclerosis symptoms.  Still having a lot of trouble with his hips particularly the right hip and will have this replaced apparently sometime in June but will ultimately need bilateral hip replacements apparently.  Tolerating Kesimpta well.       The following portions of the patient's history were reviewed and updated as appropriate: allergies, current medications, past family history, past medical history, past social history, past surgical history and problem list.    Review of Systems   Constitutional:  Positive for activity change and fatigue.   HENT:  Positive for congestion.    Eyes:  Positive for photophobia and visual disturbance.   Respiratory:  Positive for cough and shortness of breath.    Cardiovascular: Negative.    Gastrointestinal:  Positive for abdominal pain and nausea.   Musculoskeletal:  Positive for arthralgias, back pain and gait problem.   Neurological:  Positive for weakness and numbness.   Psychiatric/Behavioral:  Positive for dysphoric mood and sleep disturbance. The patient is nervous/anxious.          Current Outpatient Medications:     albuterol sulfate  (90 Base) MCG/ACT inhaler, Inhale 1 puff Every 4 (Four) Hours As Needed., Disp: , Rfl:     Ascorbic Acid (VITAMIN C PO), Take  by mouth Daily., Disp: , Rfl:     baclofen (LIORESAL) 20 MG tablet, Take 1 tablet by mouth 4 (Four) Times a Day. Alternating with Zanaflex, Disp: , Rfl:     CALCIUM PO, Take  by mouth Daily., Disp: , Rfl:     Cholecalciferol (VITAMIN D3 PO), Take  by mouth Daily., Disp: , Rfl:     citalopram (CeleXA) 20 MG tablet, Take 1 tablet by mouth Daily., Disp: , Rfl:     dicyclomine (BENTYL) 10 MG capsule, Take 1 capsule by mouth 4 (Four) Times a Day Before Meals & at Bedtime., Disp: , Rfl:     diphenhydrAMINE (BENADRYL) 25 mg capsule, Take 1 capsule by mouth At Night  As Needed for Allergies or Sleep., Disp: , Rfl:     Ferrous Sulfate (IRON PO), Take  by mouth Daily., Disp: , Rfl:     HYDROcodone-acetaminophen (NORCO)  MG per tablet, Take 1 tablet by mouth 4 (Four) Times a Day., Disp: , Rfl:     hydrOXYzine (ATARAX) 25 MG tablet, Take 1 tablet by mouth 2 (two) times a day., Disp: , Rfl:     Kesimpta 20 MG/0.4ML solution auto-injector, Inject 20 mg as directed Every 30 (Thirty) Days., Disp: 1.2 mL, Rfl: 1    lisinopril (PRINIVIL,ZESTRIL) 10 MG tablet, Take 1 tablet by mouth Daily., Disp: , Rfl:     MAGNESIUM PO, Take  by mouth Daily., Disp: , Rfl:     Melatonin 10 MG tablet, Take 2 tablets by mouth At Night As Needed. Takes 2 10mg tablets, Disp: , Rfl:     meloxicam (MOBIC) 15 MG tablet, Take 1 tablet by mouth Daily., Disp: , Rfl:     Morphine (MS CONTIN) 15 MG 12 hr tablet, Take 1 tablet by mouth 2 (Two) Times a Day., Disp: , Rfl:     pantoprazole (PROTONIX) 40 MG EC tablet, Take 1 tablet by mouth Daily., Disp: , Rfl:     Symbicort 80-4.5 MCG/ACT inhaler, Inhale 2 puffs 2 (Two) Times a Day., Disp: , Rfl:     tadalafil (CIALIS) 5 MG tablet, Take 1 tablet by mouth Daily As Needed for Erectile Dysfunction., Disp: , Rfl:     tiZANidine (ZANAFLEX) 4 MG tablet, Take 1 tablet by mouth 4 (Four) Times a Day. Alternating with baclofen., Disp: , Rfl:     traZODone (DESYREL) 100 MG tablet, Take 2 tablets by mouth Every Night., Disp: , Rfl:     valACYclovir HCl (VALTREX PO), Take  by mouth Daily As Needed., Disp: , Rfl:     VITAMIN A PO, Take  by mouth Daily., Disp: , Rfl:     VITAMIN D PO, Take  by mouth Daily., Disp: , Rfl:      Objective   Physical Exam  Vitals and nursing note reviewed.   Eyes:      General: Vision grossly intact. Gaze aligned appropriately.   Cardiovascular:      Rate and Rhythm: Normal rate.   Pulmonary:      Effort: Pulmonary effort is normal.   Neurological:      Mental Status: He is alert and oriented to person, place, and time.      GCS: GCS eye subscore is 4.  GCS verbal subscore is 5. GCS motor subscore is 6.      Cranial Nerves: Cranial nerves 2-12 are intact.      Sensory: Sensation is intact.      Motor: Weakness present.      Coordination: Coordination is intact.      Gait: Gait abnormal.      Comments: Substantial orthopedic impairment with regard to gait and lower extremity function with bilateral hip AVN   Psychiatric:         Attention and Perception: Attention normal.         Mood and Affect: Mood normal.         Speech: Speech normal.         Behavior: Behavior normal.         Cognition and Memory: Cognition normal.           Assessment & Plan   Diagnoses and all orders for this visit:    1. MS (multiple sclerosis) (Primary)  -     MRI Brain With & Without Contrast; Future    2. Quadriparesis      The patient is stable with regard to his multiple sclerosis I have elected to follow-up in September and have him obtain an MRI in August of this year.  The patient is agreeable to the plan.  Continue Kesimpta.               Dictated utilizing Dragon dictation.

## 2024-05-22 ENCOUNTER — TELEPHONE (OUTPATIENT)
Dept: NEUROLOGY | Facility: CLINIC | Age: 49
End: 2024-05-22
Payer: COMMERCIAL

## 2024-05-22 NOTE — TELEPHONE ENCOUNTER
Caller: BIRD    Relationship: W/ KESIMPTA    Best call back number: [phone]     What is the best time to reach you: ANY    Who are you requesting to speak with (clinical staff, provider,  specific staff member): REINA    What was the call regarding: IS REQUESTING STATUS OF PRIOR AUTH REQUEST FOR KESIMPTA.    PLEASE INITIATE PRIOR AUTH &/OR CALL WITH STATUS UPDATE.    USE KEY REF. #[id number]

## 2024-06-11 DIAGNOSIS — G35 MS (MULTIPLE SCLEROSIS): ICD-10-CM

## 2024-06-11 RX ORDER — OFATUMUMAB 20 MG/.4ML
INJECTION, SOLUTION SUBCUTANEOUS
Qty: 0.4 ML | Refills: 5 | Status: SHIPPED | OUTPATIENT
Start: 2024-06-11 | End: 2024-06-14 | Stop reason: SDUPTHER

## 2024-06-14 DIAGNOSIS — G35 MS (MULTIPLE SCLEROSIS): ICD-10-CM

## 2024-06-16 RX ORDER — OFATUMUMAB 20 MG/.4ML
20 INJECTION, SOLUTION SUBCUTANEOUS
Qty: 1.2 ML | Refills: 0 | Status: SHIPPED | OUTPATIENT
Start: 2024-06-16

## 2024-07-22 ENCOUNTER — TELEPHONE (OUTPATIENT)
Dept: NEUROLOGY | Facility: CLINIC | Age: 49
End: 2024-07-22
Payer: COMMERCIAL

## 2024-07-22 NOTE — TELEPHONE ENCOUNTER
Provider: MARII JOY    Caller: PATIENT    Relationship to Patient: SELF    Phone Number: [phone]    Reason for Call: PATIENT STATES HIS DEDUCTIBLE HAS BEEN MET SO HE WOULD LIKE TO SEE IF HE CAN ADD AN MRI OF HIS SPINE TO HIS CURRENT ORDERS. STATES WHILE HIS DEDUCTIBLE HAS BEEN MET HE WOULD LIKE TO SEE IF THERE IS ANY OTHER TESTING OR ORDERS THAT PROVIDER WOULD LIKE TO PLACE. STATES HE IS FINE WITH WHATEVER BUT DEFINITELY WANTS TO GET HIS SPINE LOOKED AT. WOULD LIKE A CALL FROM [facility] TO DISCUSS IF POSSIBLE. PLEASE REVIEW AND ADVISE, THANK YOU.

## 2024-07-25 ENCOUNTER — TELEPHONE (OUTPATIENT)
Dept: NEUROLOGY | Facility: CLINIC | Age: 49
End: 2024-07-25
Payer: COMMERCIAL

## 2024-07-25 NOTE — TELEPHONE ENCOUNTER
Jasvir said he can order MRI of cervical spine, we don't have a diagnosis for the lumbar spine.  Message left requesting a return call.

## 2024-07-25 NOTE — TELEPHONE ENCOUNTER
----- Message from Donovan Hernandez sent at 7/24/2024  4:35 PM CDT -----  C spine OK  ----- Message -----  From: Loulou Hilton LPN  Sent: 7/23/2024   4:36 PM CDT  To: TAMEKA Alvarez    Received a message from the hub, Parveen said his deductible has been met and he wants to know if you will order MRI of C and L spine.  He has back pain and problems with his neck.  You ordered MRI of brain for August.  He said both legs are numb.

## 2024-07-26 DIAGNOSIS — G35 MS (MULTIPLE SCLEROSIS): Primary | ICD-10-CM

## 2024-09-03 DIAGNOSIS — G35 MS (MULTIPLE SCLEROSIS): ICD-10-CM

## 2024-09-03 RX ORDER — OFATUMUMAB 20 MG/.4ML
INJECTION, SOLUTION SUBCUTANEOUS
Qty: 1.2 ML | Refills: 0 | Status: SHIPPED | OUTPATIENT
Start: 2024-09-03

## 2024-09-16 ENCOUNTER — TELEPHONE (OUTPATIENT)
Dept: NEUROLOGY | Facility: CLINIC | Age: 49
End: 2024-09-16
Payer: COMMERCIAL

## 2024-11-27 DIAGNOSIS — G35 MS (MULTIPLE SCLEROSIS): ICD-10-CM

## 2024-11-27 RX ORDER — OFATUMUMAB 20 MG/.4ML
20 INJECTION, SOLUTION SUBCUTANEOUS
Qty: 1.2 ML | Refills: 0 | Status: SHIPPED | OUTPATIENT
Start: 2024-11-27

## 2024-12-17 ENCOUNTER — HOSPITAL ENCOUNTER (OUTPATIENT)
Dept: MRI IMAGING | Facility: HOSPITAL | Age: 49
Discharge: HOME | End: 2024-12-17
Payer: COMMERCIAL

## 2024-12-17 DIAGNOSIS — G35 MS (MULTIPLE SCLEROSIS): ICD-10-CM

## 2024-12-17 PROCEDURE — A9579 GAD-BASE MR CONTRAST NOS,1ML: HCPCS | Performed by: PHYSICIAN ASSISTANT

## 2024-12-17 PROCEDURE — 72156 MRI NECK SPINE W/O & W/DYE: CPT

## 2024-12-17 PROCEDURE — 70553 MRI BRAIN STEM W/O & W/DYE: CPT

## 2024-12-17 PROCEDURE — 25510000001 GADOPICLENOL 0.5 MMOL/ML SOLUTION: Performed by: PHYSICIAN ASSISTANT

## 2024-12-17 RX ADMIN — GADOPICLENOL 10 ML: 485.1 INJECTION INTRAVENOUS at 15:41

## 2025-01-07 ENCOUNTER — TELEPHONE (OUTPATIENT)
Dept: NEUROLOGY | Facility: CLINIC | Age: 50
End: 2025-01-07
Payer: COMMERCIAL

## 2025-01-14 ENCOUNTER — TELEPHONE (OUTPATIENT)
Dept: NEUROLOGY | Facility: CLINIC | Age: 50
End: 2025-01-14
Payer: COMMERCIAL

## 2025-01-14 NOTE — TELEPHONE ENCOUNTER
----- Message from Donovan Hernandez sent at 1/7/2025  4:15 PM CST -----  Keep FU  ----- Message -----  From: Loulou Hilton LPN  Sent: 1/7/2025   3:27 PM CST  To: TAMEKA Alvarez said he is weaker, has increased numbness and tingling, more exhaustion, and coordination is worse.  His symptoms have changed in the last year and become worse.

## 2025-02-03 ENCOUNTER — TELEPHONE (OUTPATIENT)
Dept: NEUROLOGY | Facility: CLINIC | Age: 50
End: 2025-02-03
Payer: COMMERCIAL

## 2025-02-19 DIAGNOSIS — G35 MS (MULTIPLE SCLEROSIS): ICD-10-CM

## 2025-02-20 RX ORDER — OFATUMUMAB 20 MG/.4ML
20 INJECTION, SOLUTION SUBCUTANEOUS
Qty: 0.4 ML | Refills: 3 | Status: SHIPPED | OUTPATIENT
Start: 2025-02-20

## 2025-02-26 ENCOUNTER — PATIENT MESSAGE (OUTPATIENT)
Dept: NEUROLOGY | Facility: CLINIC | Age: 50
End: 2025-02-26
Payer: COMMERCIAL

## 2025-02-27 ENCOUNTER — TELEPHONE (OUTPATIENT)
Dept: NEUROLOGY | Facility: CLINIC | Age: 50
End: 2025-02-27
Payer: COMMERCIAL

## 2025-02-27 NOTE — TELEPHONE ENCOUNTER
----- Message from Donovan Hernandez sent at 2/26/2025  4:21 PM CST -----  All good, stable brain, no lesions in c spine.  ----- Message -----  From: Loulou Hilton LPN  Sent: 2/26/2025   2:52 PM CST  To: TAMEKA Alvarez would like MRI results.

## 2025-03-19 ENCOUNTER — PATIENT MESSAGE (OUTPATIENT)
Dept: NEUROLOGY | Facility: CLINIC | Age: 50
End: 2025-03-19
Payer: COMMERCIAL

## 2025-05-02 DIAGNOSIS — G35 MS (MULTIPLE SCLEROSIS): ICD-10-CM

## 2025-05-02 RX ORDER — OFATUMUMAB 20 MG/.4ML
20 INJECTION, SOLUTION SUBCUTANEOUS
Qty: 1.2 ML | Refills: 0 | Status: SHIPPED | OUTPATIENT
Start: 2025-05-02

## 2025-05-02 NOTE — TELEPHONE ENCOUNTER
URGENT    Medication requested (name and dose):      Kesimpta 20 MG/0.4ML solution auto-injector     Pharmacy where request should be sent:       Inject 20 mg under the skin into the appropriate area as directed Every 30 (Thirty) Days.     Additional details provided by patient:  requesting 90 day supply    Best call back number:  [phone]     Does the patient have less than a 3 day supply:  [x] Yes  [] No    Deneen Alcazar Rep  05/02/25, 10:39 CDT

## 2025-07-14 DIAGNOSIS — G35 MS (MULTIPLE SCLEROSIS): ICD-10-CM

## 2025-07-15 ENCOUNTER — PATIENT MESSAGE (OUTPATIENT)
Dept: NEUROLOGY | Facility: CLINIC | Age: 50
End: 2025-07-15
Payer: COMMERCIAL

## 2025-07-15 RX ORDER — OFATUMUMAB 20 MG/.4ML
INJECTION, SOLUTION SUBCUTANEOUS
Refills: 0 | OUTPATIENT
Start: 2025-07-15

## 2025-07-18 ENCOUNTER — TELEPHONE (OUTPATIENT)
Dept: NEUROLOGY | Facility: CLINIC | Age: 50
End: 2025-07-18
Payer: COMMERCIAL

## 2025-07-18 NOTE — TELEPHONE ENCOUNTER
Caller: Andres Murphy    Relationship: Self    Best call back number: [phone]    Requested Prescriptions:   Kesimpta 20 MG/0.4ML solution auto-injector       Pharmacy where request should be sent:  [facility] - [place], [place] - [address] - [phone] PH - [phone] FX  [address], [facility] [address]  Phone: [phone]  Fax: [phone]     Last office visit with prescribing clinician: 5/20/2024   Last telemedicine visit with prescribing clinician: Visit date not found   Next office visit with prescribing clinician: 7/28/2025     Additional details provided by patient: PT IS OUT OF MEDICATION    Does the patient have less than a 3 day supply:  [x] Yes  [] No    Would you like a call back once the refill request has been completed: [x] Yes [] No    If the office needs to give you a call back, can they leave a voicemail: [] Yes [x] No    Deneen Kauffman Rep   07/18/25 11:03 CDT

## 2025-07-21 DIAGNOSIS — G35 MS (MULTIPLE SCLEROSIS): ICD-10-CM

## 2025-07-21 RX ORDER — OFATUMUMAB 20 MG/.4ML
20 INJECTION, SOLUTION SUBCUTANEOUS
Qty: 1.2 ML | Refills: 0 | Status: SHIPPED | OUTPATIENT
Start: 2025-07-21

## 2025-07-28 ENCOUNTER — OFFICE VISIT (OUTPATIENT)
Dept: NEUROLOGY | Facility: CLINIC | Age: 50
End: 2025-07-28
Payer: COMMERCIAL

## 2025-07-28 VITALS
HEIGHT: 71 IN | SYSTOLIC BLOOD PRESSURE: 110 MMHG | WEIGHT: 278.2 LBS | DIASTOLIC BLOOD PRESSURE: 66 MMHG | RESPIRATION RATE: 16 BRPM | HEART RATE: 112 BPM | BODY MASS INDEX: 38.95 KG/M2

## 2025-07-28 DIAGNOSIS — M48.062 SPINAL STENOSIS OF LUMBAR REGION WITH NEUROGENIC CLAUDICATION: ICD-10-CM

## 2025-07-28 DIAGNOSIS — G35 MS (MULTIPLE SCLEROSIS): Primary | ICD-10-CM

## 2025-07-28 RX ORDER — FUROSEMIDE 40 MG/1
TABLET ORAL AS NEEDED
COMMUNITY

## 2025-07-28 RX ORDER — TIOTROPIUM BROMIDE 18 UG/1
CAPSULE ORAL; RESPIRATORY (INHALATION) AS NEEDED
COMMUNITY

## 2025-07-28 RX ORDER — TAMSULOSIN HYDROCHLORIDE 0.4 MG/1
1 CAPSULE ORAL DAILY
COMMUNITY

## 2025-07-31 NOTE — PROGRESS NOTES
Subjective   Andres Murphy is a 50 y.o. male is here today for follow-up Multiple sclerosis.    History of Present Illness  Doing well with regard to general MS symptoms, tolerating Kesimpta well presently. Has had right total hip, says he will need left.  He is having LBP, neurogenic claudication type symptoms, radicular symptoms and has been told he has lumbar spinal stenosis, requesting a NS consult.       The following portions of the patient's history were reviewed and updated as appropriate: allergies, current medications, past family history, past medical history, past social history, past surgical history and problem list.    Review of Systems   Constitutional:  Positive for activity change and fatigue.   Eyes:  Positive for visual disturbance.   Respiratory:  Positive for shortness of breath.    Gastrointestinal: Negative.    Musculoskeletal:  Positive for gait problem.   Neurological:  Positive for weakness and numbness.   Psychiatric/Behavioral:  Positive for dysphoric mood and sleep disturbance.          Current Outpatient Medications:     albuterol sulfate  (90 Base) MCG/ACT inhaler, Inhale 1 puff Every 4 (Four) Hours As Needed., Disp: , Rfl:     Ascorbic Acid (VITAMIN C PO), Take  by mouth Daily., Disp: , Rfl:     baclofen (LIORESAL) 20 MG tablet, Take 1 tablet by mouth 4 (Four) Times a Day. Alternating with Zanaflex, Disp: , Rfl:     CALCIUM PO, Take  by mouth Daily., Disp: , Rfl:     Cholecalciferol (VITAMIN D3 PO), Take  by mouth Daily., Disp: , Rfl:     citalopram (CeleXA) 20 MG tablet, Take 1 tablet by mouth Daily., Disp: , Rfl:     dicyclomine (BENTYL) 10 MG capsule, Take 1 capsule by mouth 4 (Four) Times a Day Before Meals & at Bedtime., Disp: , Rfl:     diphenhydrAMINE (BENADRYL) 25 mg capsule, Take 1 capsule by mouth At Night As Needed for Allergies or Sleep., Disp: , Rfl:     Ferrous Sulfate (IRON PO), Take  by mouth Daily., Disp: , Rfl:     furosemide (LASIX) 40 MG tablet, As  Needed., Disp: , Rfl:     HYDROcodone-acetaminophen (NORCO)  MG per tablet, Take 1 tablet by mouth 4 (Four) Times a Day., Disp: , Rfl:     hydrOXYzine (ATARAX) 25 MG tablet, Take 1 tablet by mouth 2 (two) times a day., Disp: , Rfl:     Kesimpta 20 MG/0.4ML solution auto-injector, Inject 20 mg under the skin into the appropriate area as directed Every 30 (Thirty) Days., Disp: 1.2 mL, Rfl: 0    lisinopril (PRINIVIL,ZESTRIL) 10 MG tablet, Take 1 tablet by mouth Daily., Disp: , Rfl:     MAGNESIUM PO, Take  by mouth Daily., Disp: , Rfl:     Melatonin 10 MG tablet, Take 2 tablets by mouth At Night As Needed. Takes 2 10mg tablets, Disp: , Rfl:     Morphine (MS CONTIN) 15 MG 12 hr tablet, Take 1 tablet by mouth 2 (Two) Times a Day., Disp: , Rfl:     pantoprazole (PROTONIX) 40 MG EC tablet, Take 1 tablet by mouth Daily., Disp: , Rfl:     Symbicort 80-4.5 MCG/ACT inhaler, Inhale 2 puffs 2 (Two) Times a Day., Disp: , Rfl:     tadalafil (CIALIS) 5 MG tablet, Take 1 tablet by mouth Daily As Needed for Erectile Dysfunction., Disp: , Rfl:     tamsulosin (FLOMAX) 0.4 MG capsule 24 hr capsule, Take 1 capsule by mouth Daily., Disp: , Rfl:     tiotropium (SPIRIVA) 18 MCG per inhalation capsule, As Needed., Disp: , Rfl:     tiZANidine (ZANAFLEX) 4 MG tablet, Take 1 tablet by mouth 4 (Four) Times a Day. Alternating with baclofen., Disp: , Rfl:     traZODone (DESYREL) 100 MG tablet, Take 2 tablets by mouth Every Night., Disp: , Rfl:     valACYclovir HCl (VALTREX PO), Take  by mouth Daily As Needed., Disp: , Rfl:      Objective   Physical Exam  Vitals and nursing note reviewed.   Eyes:      General: Vision grossly intact. Gaze aligned appropriately.   Cardiovascular:      Rate and Rhythm: Normal rate.   Pulmonary:      Effort: Pulmonary effort is normal.   Neurological:      Mental Status: He is alert and oriented to person, place, and time.      Cranial Nerves: Cranial nerves 2-12 are intact.      Sensory: Sensory deficit present.       Motor: Weakness present.      Coordination: Impaired rapid alternating movements.      Gait: Gait abnormal.   Psychiatric:         Attention and Perception: Attention normal.         Mood and Affect: Mood normal.         Speech: Speech normal.         Behavior: Behavior normal.         Cognition and Memory: Cognition normal.           Assessment & Plan   Diagnoses and all orders for this visit:    1. MS (multiple sclerosis) (Primary)  -     Immunoglobulins Quant; Future  -     CBC & Differential  -     Comprehensive Metabolic Panel    2. Spinal stenosis of lumbar region with neurogenic claudication  -     Ambulatory Referral to Neurosurgery      Continue Kesimpta.  Obtain labs, plan follow up MRI after 1/1/26.    He is presently in PT for LSS/ LBP - I have instructed him to get all images and will ask Feng Bowen MD for a surgical opinion.               Dictated utilizing Dragon dictation.

## (undated) DEVICE — BITEBLOCK ENDO W/STRAP 60F A/ LF DISP

## (undated) DEVICE — TRAP,MUCUS SPECIMEN,40CC: Brand: MEDLINE

## (undated) DEVICE — SINGLE-USE BIOPSY FORCEPS: Brand: RADIAL JAW 4